# Patient Record
Sex: MALE | Race: WHITE | NOT HISPANIC OR LATINO | URBAN - METROPOLITAN AREA
[De-identification: names, ages, dates, MRNs, and addresses within clinical notes are randomized per-mention and may not be internally consistent; named-entity substitution may affect disease eponyms.]

---

## 2023-05-18 ENCOUNTER — PREP FOR PROCEDURE (OUTPATIENT)
Dept: INTERVENTIONAL RADIOLOGY/VASCULAR | Facility: CLINIC | Age: 73
End: 2023-05-18

## 2023-05-18 DIAGNOSIS — N18.6 ESRD (END STAGE RENAL DISEASE) (HCC): Primary | ICD-10-CM

## 2023-05-30 ENCOUNTER — TELEPHONE (OUTPATIENT)
Dept: INTERVENTIONAL RADIOLOGY/VASCULAR | Facility: HOSPITAL | Age: 73
End: 2023-05-30

## 2023-05-31 ENCOUNTER — TELEPHONE (OUTPATIENT)
Dept: RADIOLOGY | Facility: HOSPITAL | Age: 73
End: 2023-05-31

## 2023-05-31 DIAGNOSIS — E83.39 HYPERPHOSPHATEMIA: Primary | ICD-10-CM

## 2023-05-31 RX ORDER — SEVELAMER CARBONATE 800 MG/1
800 TABLET, FILM COATED ORAL
Qty: 270 TABLET | Refills: 3 | Status: SHIPPED | OUTPATIENT
Start: 2023-05-31

## 2023-07-05 DIAGNOSIS — E83.39 HYPERPHOSPHATEMIA: ICD-10-CM

## 2023-07-05 RX ORDER — SEVELAMER CARBONATE 800 MG/1
1600 TABLET, FILM COATED ORAL
Qty: 540 TABLET | Refills: 3 | Status: SHIPPED | OUTPATIENT
Start: 2023-07-05

## 2024-01-31 DIAGNOSIS — E83.39 HYPERPHOSPHATEMIA: ICD-10-CM

## 2024-01-31 RX ORDER — SEVELAMER CARBONATE 800 MG/1
2400 TABLET, FILM COATED ORAL
Qty: 810 TABLET | Refills: 3 | Status: SHIPPED | OUTPATIENT
Start: 2024-01-31

## 2024-03-29 DIAGNOSIS — E83.39 HYPERPHOSPHATEMIA: Primary | ICD-10-CM

## 2024-03-29 DIAGNOSIS — N18.6 ESRD (END STAGE RENAL DISEASE) ON DIALYSIS (HCC): ICD-10-CM

## 2024-03-29 DIAGNOSIS — Z99.2 ESRD (END STAGE RENAL DISEASE) ON DIALYSIS (HCC): ICD-10-CM

## 2024-03-29 RX ORDER — SUCROFERRIC OXYHYDROXIDE 500 MG/1
1 TABLET, CHEWABLE ORAL
Qty: 270 TABLET | Refills: 3 | Status: SHIPPED | OUTPATIENT
Start: 2024-03-29

## 2024-06-11 ENCOUNTER — TREATMENT (OUTPATIENT)
Dept: NEPHROLOGY | Facility: CLINIC | Age: 74
End: 2024-06-11

## 2024-06-11 DIAGNOSIS — N18.6 ESRD (END STAGE RENAL DISEASE) ON DIALYSIS (HCC): Primary | ICD-10-CM

## 2024-06-11 DIAGNOSIS — Z99.2 ESRD (END STAGE RENAL DISEASE) ON DIALYSIS (HCC): Primary | ICD-10-CM

## 2024-06-11 NOTE — PROGRESS NOTES
ESRD Follow Up Note    Alexandro Jefferson was recently hospitized.     Reason for admission: GI    Outpatient unit: [place]    Outpatient Shift: MWF 1    Follow up was done on 6/10/2024 , In-person    Changes made: Sarita Mc was seen for hospital follow-up at dialysis clinic on 6/10.  He was hospitalized with diverticulitis.  He was treated with a 7-day course of antibiotics.  He was uncertain concerning the name of the antibiotic but he is feeling better.  He was having very severe diarrhea when he was ill and felt that he was very dehydrated.  I encouraged him to let us know at dialysis when he is having a flare so that we may be able to provide some additional fluids on treatment.  He was in agreement with this plan.  I encouraged follow-up with GI but he is more comfortable at this time following up with his primary care provider.  No adjustment in dialysis prescription at this time.  No indication to adjust target weight.

## 2024-08-14 DIAGNOSIS — E83.39 HYPERPHOSPHATEMIA: ICD-10-CM

## 2024-08-14 RX ORDER — SEVELAMER CARBONATE 800 MG/1
2400 TABLET, FILM COATED ORAL
Qty: 810 TABLET | Refills: 3 | Status: SHIPPED | OUTPATIENT
Start: 2024-08-14

## 2024-09-16 DIAGNOSIS — I77.0 AVF (ARTERIOVENOUS FISTULA) (HCC): ICD-10-CM

## 2024-09-16 DIAGNOSIS — N18.6 ESRD (END STAGE RENAL DISEASE) ON DIALYSIS (HCC): Primary | ICD-10-CM

## 2024-09-16 DIAGNOSIS — Z01.818 PRE-TRANSPLANT EVALUATION FOR CKD (CHRONIC KIDNEY DISEASE): ICD-10-CM

## 2024-09-16 DIAGNOSIS — Z99.2 ESRD (END STAGE RENAL DISEASE) ON DIALYSIS (HCC): Primary | ICD-10-CM

## 2024-09-17 ENCOUNTER — TELEPHONE (OUTPATIENT)
Age: 74
End: 2024-09-17

## 2024-09-17 NOTE — TELEPHONE ENCOUNTER
Caller: Merced Jefferson    Doctor: ANY VS    Reason for call: Wife calling to schedule:    CONSULT - REF NEPH. ESRD (end stage renal disease) on dialysis; discuss AVF. VM ordered and will need to be scheduled.  Pt currently has a chest port  T/TH appts in [place] preferred    Call back#: [phone]

## 2024-10-03 ENCOUNTER — HOSPITAL ENCOUNTER (OUTPATIENT)
Dept: NON INVASIVE DIAGNOSTICS | Facility: HOSPITAL | Age: 74
Discharge: HOME | End: 2024-10-03
Attending: INTERNAL MEDICINE
Payer: MEDICARE

## 2024-10-03 DIAGNOSIS — N18.6 ESRD (END STAGE RENAL DISEASE) ON DIALYSIS (HCC): ICD-10-CM

## 2024-10-03 DIAGNOSIS — Z99.2 ESRD (END STAGE RENAL DISEASE) ON DIALYSIS (HCC): ICD-10-CM

## 2024-10-03 DIAGNOSIS — Z01.818 PRE-TRANSPLANT EVALUATION FOR CKD (CHRONIC KIDNEY DISEASE): ICD-10-CM

## 2024-10-03 PROCEDURE — 93985 DUP-SCAN HEMO COMPL BI STD: CPT

## 2024-10-05 PROCEDURE — 93985 DUP-SCAN HEMO COMPL BI STD: CPT | Performed by: SURGERY

## 2024-10-08 ENCOUNTER — TELEPHONE (OUTPATIENT)
Dept: VASCULAR SURGERY | Facility: CLINIC | Age: 74
End: 2024-10-08

## 2024-10-08 ENCOUNTER — OFFICE VISIT (OUTPATIENT)
Dept: VASCULAR SURGERY | Facility: CLINIC | Age: 74
End: 2024-10-08
Payer: MEDICARE

## 2024-10-08 ENCOUNTER — CONSULT (OUTPATIENT)
Dept: CARDIOLOGY CLINIC | Facility: CLINIC | Age: 74
End: 2024-10-08
Payer: MEDICARE

## 2024-10-08 VITALS
SYSTOLIC BLOOD PRESSURE: 124 MMHG | HEIGHT: 70 IN | HEART RATE: 80 BPM | OXYGEN SATURATION: 99 % | BODY MASS INDEX: 31.07 KG/M2 | DIASTOLIC BLOOD PRESSURE: 58 MMHG | WEIGHT: 217 LBS

## 2024-10-08 VITALS
HEIGHT: 70 IN | BODY MASS INDEX: 30.69 KG/M2 | DIASTOLIC BLOOD PRESSURE: 70 MMHG | SYSTOLIC BLOOD PRESSURE: 120 MMHG | HEART RATE: 114 BPM | WEIGHT: 214.4 LBS

## 2024-10-08 DIAGNOSIS — I26.99 OTHER PULMONARY EMBOLISM WITHOUT ACUTE COR PULMONALE, UNSPECIFIED CHRONICITY (HCC): ICD-10-CM

## 2024-10-08 DIAGNOSIS — Z01.810 PREOP CARDIOVASCULAR EXAM: Primary | ICD-10-CM

## 2024-10-08 DIAGNOSIS — I77.0 AVF (ARTERIOVENOUS FISTULA) (HCC): ICD-10-CM

## 2024-10-08 DIAGNOSIS — N18.6 ESRD (END STAGE RENAL DISEASE) ON DIALYSIS (HCC): Primary | ICD-10-CM

## 2024-10-08 DIAGNOSIS — Z86.79 HISTORY OF ENDOCARDITIS: ICD-10-CM

## 2024-10-08 DIAGNOSIS — I27.9 CHRONIC PULMONARY HEART DISEASE (HCC): ICD-10-CM

## 2024-10-08 DIAGNOSIS — N18.6 ESRD (END STAGE RENAL DISEASE) ON DIALYSIS (HCC): ICD-10-CM

## 2024-10-08 DIAGNOSIS — Z99.2 ESRD (END STAGE RENAL DISEASE) ON DIALYSIS (HCC): ICD-10-CM

## 2024-10-08 DIAGNOSIS — Z79.899 ON AMIODARONE THERAPY: ICD-10-CM

## 2024-10-08 DIAGNOSIS — Z99.2 ESRD (END STAGE RENAL DISEASE) ON DIALYSIS (HCC): Primary | ICD-10-CM

## 2024-10-08 DIAGNOSIS — I48.0 PAROXYSMAL ATRIAL FIBRILLATION (HCC): ICD-10-CM

## 2024-10-08 DIAGNOSIS — E11.51 DIABETES MELLITUS WITH PERIPHERAL CIRCULATORY DISORDER (HCC): ICD-10-CM

## 2024-10-08 PROCEDURE — 99204 OFFICE O/P NEW MOD 45 MIN: CPT | Performed by: INTERNAL MEDICINE

## 2024-10-08 PROCEDURE — 99204 OFFICE O/P NEW MOD 45 MIN: CPT | Performed by: SURGERY

## 2024-10-08 PROCEDURE — 93000 ELECTROCARDIOGRAM COMPLETE: CPT | Performed by: INTERNAL MEDICINE

## 2024-10-08 RX ORDER — ACETAMINOPHEN 160 MG
2000 TABLET,DISINTEGRATING ORAL
COMMUNITY

## 2024-10-08 RX ORDER — AMIODARONE HYDROCHLORIDE 200 MG/1
200 TABLET ORAL DAILY
Qty: 90 TABLET | Refills: 3 | Status: SHIPPED | OUTPATIENT
Start: 2024-10-08

## 2024-10-08 RX ORDER — APIXABAN 5 MG/1
TABLET, FILM COATED ORAL
COMMUNITY

## 2024-10-08 RX ORDER — FAMOTIDINE 20 MG/1
20 TABLET, FILM COATED ORAL 2 TIMES DAILY
COMMUNITY
Start: 2024-08-04

## 2024-10-08 RX ORDER — CEFAZOLIN SODIUM 2 G/50ML
2000 SOLUTION INTRAVENOUS ONCE
OUTPATIENT
Start: 2024-10-08 | End: 2024-10-08

## 2024-10-08 RX ORDER — AMIODARONE HYDROCHLORIDE 100 MG/1
TABLET ORAL
COMMUNITY
End: 2024-10-08 | Stop reason: SDUPTHER

## 2024-10-08 RX ORDER — ONDANSETRON 8 MG/1
8 TABLET, ORALLY DISINTEGRATING ORAL EVERY 8 HOURS PRN
COMMUNITY
Start: 2024-07-18 | End: 2024-10-16

## 2024-10-08 RX ORDER — CHLORHEXIDINE GLUCONATE ORAL RINSE 1.2 MG/ML
15 SOLUTION DENTAL ONCE
OUTPATIENT
Start: 2024-10-08 | End: 2024-10-08

## 2024-10-08 RX ORDER — FOLIC ACID/VIT B COMPLEX AND C 0.8 MG
TABLET ORAL
COMMUNITY

## 2024-10-08 RX ORDER — FOLIC ACID 1 MG/1
TABLET ORAL
COMMUNITY
Start: 2024-08-04

## 2024-10-08 NOTE — ASSESSMENT & PLAN NOTE
He is noted to be in atrial fibrillation with rapid ventricular response.  Has right bundle branch block abnormality.  ECGs from recent hospitalization at [facility] suggest sinus rhythm with sinus tachycardia and frequent premature atrial contractions.  Right bundle branch block was chronic.  Documentation represents that he has chronic A-fib which is unlikely given sinus rhythm recently.    -- I am requesting an extended Holter monitor to assess his overall cardiac rhythm and consider rate control medications if necessary.  -- Chronic anticoagulation is recommended.  Given lack of history of stroke okay to hold it briefly for AV fistula surgery which is planned in the near future.  -- Will need close monitoring for occurrence of thromboembolism.  -- Anticoagulation should be reinitiated soon after the surgery.

## 2024-10-08 NOTE — PROGRESS NOTES
Ambulatory Visit  Name: Alexandro Jefferson      : 1950      MRN: [id number]  Encounter Provider: Olivia Ball DO  Encounter Date: 10/8/2024   Encounter department: [facility]    Assessment & Plan  ESRD (end stage renal disease) on dialysis ([facility])  Lab Results   Component Value Date    EGFR 9 (L) 2024    EGFR 8 (L) 2024    EGFR 6 (L) 09/10/2024    CREATININE 6.17 (H) 2024    CREATININE 6.89 (H) 2024    CREATININE 9.05 (H) 09/10/2024   After discussion of possible Endo AVF on the left versus new right upper extremity AV graft on the right patient has elected to proceed with creation of new right upper extremity AV graft.  The benefits, risks including but not limited to infection, bleeding, steal syndrome and alternatives were reviewed with patient.  Patient was agreeable to proceed.  Written consent was obtained (with approval from Alexandro)  by the patient's wife who was present in the office today secondary to patient's limited right hand/digit function.    Orders:    Ambulatory Referral to Vascular Surgery    Ambulatory Referral to Cardiology; Future    AVF (arteriovenous fistula) ([facility])    Orders:    Ambulatory Referral to Vascular Surgery    Ambulatory Referral to Cardiology; Future      History of Present Illness     Patient is new to our office. He is here today to review results of Vein Mapping done 10/3/2024. Patient is getting HD M-W- at [facility] via right side chest perm cath. Patient had a previous AVF in his left arm that stopped working several weeks ago. Patient is a non-smoker.         Alexandro Jefferson is a 74 y.o. male who presents to the office to discuss new dialysis access creation.  He has history of prior left wrist/forearm fistula created by Dr. Cooper Ulloa.  Back in August he was hospitalized with left arm swelling.  It appears he underwent IR evaluation/fistulogram with reports of perianastomotic stenosis as well as high-grade  "stenosis/occlusion of left subclavian vein versus chronic DVT noted on duplex.  He is currently dialyzing Monday, Wednesday, and Fridays at [facility] via a right chest wall catheter.  Of note he was also recently treated for a mitral valve endocarditis/MSSA bacteremia.  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    History obtained from : patient and patient's Significant Other  Review of Systems   Constitutional: Negative.    HENT: Negative.     Eyes: Negative.    Respiratory:  Positive for shortness of breath.    Cardiovascular: Negative.    Gastrointestinal: Negative.    Endocrine: Negative.    Genitourinary: Negative.    Musculoskeletal:  Positive for gait problem.   Skin: Negative.    Allergic/Immunologic: Positive for food allergies (shellfish).   Hematological: Negative.    Psychiatric/Behavioral: Negative.       Past Medical History   History reviewed. No pertinent past medical history.  Past Surgical History:   Procedure Laterality Date    FL LUMBAR PUNCTURE DIAGNOSTIC  3/19/2021     History reviewed. No pertinent family history.  Current Outpatient Medications on File Prior to Visit   Medication Sig Dispense Refill    Acetaminophen 500 MG Take 500 mg by mouth      Cholecalciferol (Vitamin D3) 50 MCG (2000 UT) capsule Take 2,000 Units by mouth      cholestyramine light 4 g POWD Take 4 g by mouth 2 (two) times a day      Eliquis 5 MG       famotidine (PEPCID) 20 mg tablet Take 20 mg by mouth 2 (two) times a day      folic acid (FOLVITE) 1 mg tablet       ondansetron (ZOFRAN-ODT) 8 mg disintegrating tablet Apply 8 mg to cheek every 8 (eight) hours as needed      sevelamer carbonate (RENVELA) 800 mg tablet Take 3 tablets (2,400 mg total) by mouth 3 (three) times a day with meals 810 tablet 3    Sucroferric Oxyhydroxide (Velphoro) 500 MG CHEW Chew 1 tablet (500 mg total) 3 (three) times a day with meals " "(Patient not taking: Reported on 10/8/2024) 270 tablet 3     No current facility-administered medications on file prior to visit.     Allergies   Allergen Reactions    Metformin GI Intolerance and Nausea Only    Cinacalcet Diarrhea and GI Intolerance    Gabapentin GI Intolerance    Oxycodone Confusion     \"Feel spacey\"    Scopolamine Confusion, Dizziness and Hallucinations     Transdermal patch   Incoherent speech    Shellfish Allergy - Food Allergy Other (See Comments)     gout      Current Outpatient Medications on File Prior to Visit   Medication Sig Dispense Refill    Acetaminophen 500 MG Take 500 mg by mouth      Cholecalciferol (Vitamin D3) 50 MCG (2000 UT) capsule Take 2,000 Units by mouth      cholestyramine light 4 g POWD Take 4 g by mouth 2 (two) times a day      Eliquis 5 MG       famotidine (PEPCID) 20 mg tablet Take 20 mg by mouth 2 (two) times a day      folic acid (FOLVITE) 1 mg tablet       ondansetron (ZOFRAN-ODT) 8 mg disintegrating tablet Apply 8 mg to cheek every 8 (eight) hours as needed      sevelamer carbonate (RENVELA) 800 mg tablet Take 3 tablets (2,400 mg total) by mouth 3 (three) times a day with meals 810 tablet 3    Sucroferric Oxyhydroxide (Velphoro) 500 MG CHEW Chew 1 tablet (500 mg total) 3 (three) times a day with meals (Patient not taking: Reported on 10/8/2024) 270 tablet 3     No current facility-administered medications on file prior to visit.      Social History     Tobacco Use    Smoking status: Never    Smokeless tobacco: Never   Vaping Use    Vaping status: Never Used   Substance and Sexual Activity    Alcohol use: Not Currently    Drug use: Never    Sexual activity: Not on file         Objective     /58 (BP Location: Left arm, Patient Position: Sitting, Cuff Size: Standard)   Pulse 80   Ht 5' 10\" (1.778 m)   Wt 98.4 kg (217 lb)   SpO2 99%   BMI 31.14 kg/m²     Physical Exam  Constitutional:       General: He is not in acute distress.     Appearance: He is " well-developed.   HENT:      Head: Normocephalic and atraumatic.   Eyes:      General: No scleral icterus.     Conjunctiva/sclera: Conjunctivae normal.   Neck:      Trachea: No tracheal deviation.   Cardiovascular:      Rate and Rhythm: Normal rate and regular rhythm.      Pulses:           Radial pulses are 0 on the right side and 0 on the left side.      Heart sounds: Normal heart sounds.   Pulmonary:      Effort: Pulmonary effort is normal.      Breath sounds: Normal breath sounds.   Abdominal:      Palpations: Abdomen is soft. Mass: no appreciable aortic pulsation/aneurysm.   Musculoskeletal:         General: Normal range of motion.      Cervical back: Normal range of motion and neck supple.   Skin:     General: Skin is warm and dry.   Neurological:      Mental Status: He is alert and oriented to person, place, and time.   Psychiatric:         Mood and Affect: Mood normal.         Behavior: Behavior normal.         Thought Content: Thought content normal.         Judgment: Judgment normal.       Administrative Statements   I have spent a total time of 45 minutes in caring for this patient on the day of the visit/encounter including Diagnostic results, Prognosis, Risks and benefits of tx options, Instructions for management, Patient and family education, Importance of tx compliance, Risk factor reductions, Impressions, Counseling / Coordination of care, Documenting in the medical record, Reviewing / ordering tests, medicine, procedures  , and Obtaining or reviewing history  .    Operative Scheduling Information:    Hospital:  [facility]    Physician:  Quinn    Surgery: Creation of right upper extremity AV graft    Urgency:  Standard    Level:  Level 3: Outpatients to be scheduled for elective surgery than can be delayed up to 4 weeks without reasonable expectation of detriment to patient    Case Length:  3 hours    Post-op Bed:  Outpatient    OR Table:  Standard    Equipment Needs:  Product/Implant: Tapered  "[place] graft    Medication Instructions:  Eliquis:  Hold for 2 days prior to procedure (if okay with cardiology\".  Of note patient will require cardiac restratification/clearance from cardiology secondary to recent history of mitral valve endocarditis.    Hydration:  No    Contrast Allergy:  no   "

## 2024-10-08 NOTE — ASSESSMENT & PLAN NOTE
RELEVANT FINDINGS   Needs right AV fistula/graft creation for hemodialysis.  Has no absolute contraindication for undergoing this procedure.  He is noted to be in atrial fibrillation.  Though his notes mention he has chronic A-fib previous EKGs at [facility] showed sinus tachycardia with PACs.  Suspect atrial fibrillation is more paroxysmal than chronic.  Recent echocardiogram demonstrated preserved left ventricular function.  There was no evidence of left atrial appendage thrombus.   CURRENT RELEVANT MEDICATIONS Is on amiodarone 100 mg daily.  He is on Eliquis 5 mg twice daily.   GOALS Minimize risk for CAD relating to planned procedure.   MEDICATION CHANGES Can undergo planned surgery without further cardiac testing.  Given high risk for bleeding okay to hold Eliquis 48 hours before the procedure.   OTHER RECOMMENDATIONS -- May proceed with planned surgery.  -- May use periprocedural IV beta-blocker to control heart rate to keep systolic blood pressures consistently at or less than 100 mmHg.  -- Needs chronic care for atrial fibrillation.  -- I am advising him to increase the dose of amiodarone to 200 mg once daily for now.  -- I am arranging him to undergo 2-week extended Holter monitor to analyze his cardiac rhythm and we will consider rate control medications or may be 2-3 times weekly digoxin to control heart rate if he is tachycardic.  -- Advising to keep an eye on symptoms and report to us if he develops any palpitations or passing out or near passing out episodes or has worsening shortness of breath with physical activity.  -- Owing to his comorbidities would not consider him for cardioversion at this time.

## 2024-10-08 NOTE — ASSESSMENT & PLAN NOTE
Lab Results   Component Value Date    EGFR 9 (L) 09/14/2024    EGFR 8 (L) 09/12/2024    EGFR 6 (L) 09/10/2024    CREATININE 6.17 (H) 09/14/2024    CREATININE 6.89 (H) 09/12/2024    CREATININE 9.05 (H) 09/10/2024   After discussion of possible Endo AVF on the left versus new right upper extremity AV graft on the right patient has elected to proceed with creation of new right upper extremity AV graft.  The benefits, risks including but not limited to infection, bleeding, steal syndrome and alternatives were reviewed with patient.  Patient was agreeable to proceed.  Written consent was obtained (with approval from Alexandro)  by the patient's wife who was present in the office today secondary to patient's limited right hand/digit function.    Orders:    Ambulatory Referral to Vascular Surgery    Ambulatory Referral to Cardiology; Future

## 2024-10-08 NOTE — ASSESSMENT & PLAN NOTE
On antiarrhythmic therapy with amiodarone.  I am advising him to increase the amiodarone to 200 mg once daily.  A new prescription is sent to his pharmacy.  -- He will need close follow-up for amiodarone related side effects and toxicities.  The schedule of monitoring is outlined below.    RECOMMENDED ROUTINE MONITORING FOR PATIENTS RECEIVING AMIODARONE  Thyroid function tests--- Baseline and every 6 mo   Electrolytes, serum creatinine --- Baseline and as indicated   Chest radiograph--- Baseline and annually   Pulmonary function tests, with Dlco -- Baseline and for unexplained dyspnea or new chest radiographic findings.  Ophthalmologic evaluation--- If visual impairment or for symptoms   ECG--- Baseline and annually

## 2024-10-08 NOTE — PROGRESS NOTES
[facility]  [facility]  Primary Office: [address] [place] [id number]  Phone: [phone]; Fax: [phone]  *-*-*-*-*-*-*-*-*-*-*-*-*-*-*-*-*-*-*-*-*-*-*-*-*-*-*-*-*-*-*-*-*-*-*-*-*-*-*-*-*-*-*-*-*-*-*-*-*-*-*-*-*-*  ENCOUNTER DATE: 10/08/24 4:17 PM  PATIENT NAME: Alexandro Jefferson   1950    [id number]  Age: 74 y.o.      Sex: male  ENCOUNTER PROVIDER:  Ana Lilia Barkley MD, [facility], [facility]  PRIMARYCARE PHYSICIAN: Nehemias Sanchez MD  REFERRING PHYSICIAN: Olivia Ball DO  [address] [place] [id number] Olivia Ball DO   *-*-*-*-*-*-*-*-*-*-*-*-*-*-*-*-*-*-*-*-*-*-*-*-*-*-*-*-*-*-*-*-*-*-*-*-*-*-*-*-*-*-*-*-*-*-*-*-*-*-*-*-*-*-  REASON FOR REFERRAL: Operative cardiac risk provide AV fistula/ graft creation.    *-*-*-*-*-*-*-*-*-*-*-*-*-*-*-*-*-*-*-*-*-*-*-*-*-*-*-*-*-*-*-*-*-*-*-*-*-*-*-*-*-*-*-*-*-*-*-*-*-*-*-*-*-*-  CARDIOLOGY ASSESSMENT & PLAN:   Diagnosis ICD-10-CM Associated Orders   1. Preop cardiovascular exam  Z01.810 POCT ECG     AMB extended holter monitor      2. ESRD (end stage renal disease) on dialysis ([facility])  N18.6 Ambulatory Referral to Cardiology    Z99.2       3. AVF (arteriovenous fistula) ([facility])  I77.0 Ambulatory Referral to Cardiology      4. Paroxysmal atrial fibrillation ([facility])  I48.0 AMB extended holter monitor     amiodarone 200 mg tablet      5. Other pulmonary embolism without acute cor pulmonale, unspecified chronicity ([facility])  I26.99       6. Chronic pulmonary heart disease ([facility])  I27.9       7. History of endocarditis  Z86.79         1. Preop cardiovascular exam  Assessment & Plan:    RELEVANT FINDINGS   Needs right AV fistula/graft creation for hemodialysis.  Has no absolute contraindication for undergoing this procedure.  He is noted to be in atrial fibrillation.  Though his notes mention he has chronic A-fib previous EKGs at [facility] showed sinus tachycardia with PACs.  Suspect atrial  fibrillation is more paroxysmal than chronic.  Recent echocardiogram demonstrated preserved left ventricular function.  There was no evidence of left atrial appendage thrombus.   CURRENT RELEVANT MEDICATIONS Is on amiodarone 100 mg daily.  He is on Eliquis 5 mg twice daily.   GOALS Minimize risk for CAD relating to planned procedure.   MEDICATION CHANGES Can undergo planned surgery without further cardiac testing.  Given high risk for bleeding okay to hold Eliquis 48 hours before the procedure.   OTHER RECOMMENDATIONS -- May proceed with planned surgery.  -- May use periprocedural IV beta-blocker to control heart rate to keep systolic blood pressures consistently at or less than 100 mmHg.  -- Needs chronic care for atrial fibrillation.  -- I am advising him to increase the dose of amiodarone to 200 mg once daily for now.  -- I am arranging him to undergo 2-week extended Holter monitor to analyze his cardiac rhythm and we will consider rate control medications or may be 2-3 times weekly digoxin to control heart rate if he is tachycardic.  -- Advising to keep an eye on symptoms and report to us if he develops any palpitations or passing out or near passing out episodes or has worsening shortness of breath with physical activity.  -- Owing to his comorbidities would not consider him for cardioversion at this time.     Orders:  -     POCT ECG  -     AMB extended holter monitor; Future; Expected date: 10/08/2024  2. ESRD (end stage renal disease) on dialysis ([facility])  -     Ambulatory Referral to Cardiology  3. AVF (arteriovenous fistula) ([facility])  -     Ambulatory Referral to Cardiology  4. Paroxysmal atrial fibrillation ([facility])  Assessment & Plan:  He is noted to be in atrial fibrillation with rapid ventricular response.  Has right bundle branch block abnormality.  ECGs from recent hospitalization at [facility] suggest sinus rhythm with sinus tachycardia and frequent premature atrial contractions.  Right bundle branch block  was chronic.  Documentation represents that he has chronic A-fib which is unlikely given sinus rhythm recently.    -- I am requesting an extended Holter monitor to assess his overall cardiac rhythm and consider rate control medications if necessary.  -- Chronic anticoagulation is recommended.  Given lack of history of stroke okay to hold it briefly for AV fistula surgery which is planned in the near future.  -- Will need close monitoring for occurrence of thromboembolism.  -- Anticoagulation should be reinitiated soon after the surgery.  Orders:  -     AMB extended holter monitor; Future; Expected date: 10/08/2024  -     amiodarone 200 mg tablet; Take 1 tablet (200 mg total) by mouth daily  5. Other pulmonary embolism without acute cor pulmonale, unspecified chronicity ([facility])  6. Chronic pulmonary heart disease ([facility])  7. History of endocarditis  Assessment & Plan:  Recent questionable history of endocarditis in the setting of MSSA bacteremia.  On examination I did not appreciate significant murmur.  Recent NIGEL showed small mobile fibrinous strand density on the posterior mitral valve leaflet with mild mitral valve regurgitation.  Has been afebrile recently.  Has been able to tolerate dialysis.    -- With continued current therapy and consider repeat echocardiogram in 2 to 3 months if he is having any symptoms.     *-*-*-*-*-*-*-*-*-*-*-*-*-*-*-*-*-*-*-*-*-*-*-*-*-*-*-*-*-*-*-*-*-*-*-*-*-*-*-*-*-*-*-*-*-*-*-*-*-*-*-*-*-*-  CURRENT ECG:  Results for orders placed or performed in visit on 10/08/24   POCT ECG    Narrative    Atrial fibrillation with rapid ventricular response, right bundle branch block with repolarization abnormalities.   bpm, nonspecific ST-T wave abnormalities diffusely.  Compared to reports of EKGs from [facility] from September 2024 atrial fibrillation rhythm is new.  Previous reported ECG showed sinus tachycardia with PACs right bundle branch block.        *-*-*-*-*-*-*-*-*-*-*-*-*-*-*-*-*-*-*-*-*-*-*-*-*-*-*-*-*-*-*-*-*-*-*-*-*-*-*-*-*-*-*-*-*-*-*-*-*-*-*-*-*-*-  HISTORY OF PRESENT ILLNESS:  Mr. Alexandro Jefferson is a 74-year-old gentleman who is here for visit accompanied with his wife.  He has complicated medical history that includes:  1.  Remote history of pulmonary embolism  2.  History of end-stage renal  disease, on hemodialysis since 2021.  3.  History of pancreatitis leading up to prolonged hospitalization and dependence on mechanical ventilation, history of tracheostomy and PEG tube placement  4.  Now reported as chronic atrial fibrillation  5.  Secondary hyperparathyroidism  6.  History of dialysis fistula stenosis  7.  Status post recent MSSA bacteremia with suspected endocarditis treated with prolonged antibiotic therapy  9.  Diabetes mellitus  10.  Obesity  11.  Ambulatory dysfunction  12.  Anemia due to chronic kidney disease  13.  Pulmonary hypertension  14.  Aortic valve stenosis.    He was seen in vascular surgery office this morning and is being considered for right arm AV graft creation for hemodialysis.  He previously had AV fistula in the left arm which developed stenosis.  She underwent temporary right IJ/subclavian hemodialysis catheter placement in August however subsequently he developed febrile illness and was diagnosed with possible endocarditis and bacteremia.  He was treated with antibiotic therapy.  NIGEL showed some fibrinous strands on the mitral valve concerning for endocarditis.  He completed antibiotic therapy yesterday.    As per him and his wife he was never diagnosed with coronary artery disease or stroke or congestive heart failure.    From a symptom perspective he reports that he is overall all right.  He does get fatigued easily and has exertional shortness of breath.  Denies any feeling of palpitations or passing out or near passing out.  He uses a walker for ambulation.  He has had no recent falls.  Reports that he  sleeps in recliner.  He does have chronic stable lower extremity swelling.  He reports he was diagnosed with sleep apnea in the past but following his prolonged hospitalization in 2021 he no longer needed the CPAP.    Functional capacity status: Poor due to comorbidities   (Excellent- >10 METs; Good: (7-10 METs); Moderate (4-7 METs); Poor (<= 4 METs)    Any chronic stressors: None   (feeling of poor health, financial problems, and social isolation etc).    Tobacco or alcohol dependence: He has never been a smoker.  He denies any alcohol use or any history of illicit drug use.    He retired from various jobs including previously working at [employer] for 24 years and then other jobs and subsequently retired from selling cars.    Family history: His father had aortic valve replacement at 80 years of age.    Current cardiac meds: Eliquis 5 mg twice daily amiodarone 100 mg daily.  He is not on any beta-blocker or other medications.    Previous blood work:   Blood work 9/14/2024 shows sodium 135 potassium 3.8 chloride 101 bicarb 23 BUN 40 creatinine 6.17 GFR 9 phosphorus 6.4  Lipid profile 7/1/2022 total cholesterol 192 triglyceride 374 HDL 32 calculated LDL 75  High-sensitivity troponins checked on 8/26/2024 negative at 35 and 40 respectively.    Previous cardiac studies:   Transesophageal echocardiogram 8/29/2024: Normal left ventricular size and function.  EF 55 to 60%.  Dilated right ventricle with normal right ventricular function.  Normal left atrial size.  Normal flow velocities in left atrial appendage with no evidence of thrombus.  No PFO.  Normal right atrial size.  Mild mitral valve regurgitatio.  Small, mobile, vegetation versus fibrinous strand on the posterior mitral leaflet measuring 0.49 cm.  Freely mobile.  Mild tricuspid valve regurgitation.  Moderate aortic valve stenosis with aortic valve area 1.3 cm², peak gradient 3 m/s and mean gradient 34, DVI 0.4  No pulmonic valve stenosis or regurgitation.  Small  "amount of atherosclerotic plaque noted in aorta.  No pericardial effusion    *-*-*-*-*-*-*-*-*-*-*-*-*-*-*-*-*-*-*-*-*-*-*-*-*-*-*-*-*-*-*-*-*-*-*-*-*-*-*-*-*-*-*-*-*-*-*-*-*-*-*-*-*-*  PAST MEDICAL HISTORY:  No past medical history on file. PAST SURGICAL HISTORY:   Past Surgical History:   Procedure Laterality Date    FL LUMBAR PUNCTURE DIAGNOSTIC  3/19/2021         FAMILY HISTORY:  No family history on file. SOCIAL HISTORY:  Social History     Tobacco Use   Smoking Status Never   Smokeless Tobacco Never      Social History     Substance and Sexual Activity   Alcohol Use Not Currently     Social History     Substance and Sexual Activity   Drug Use Never    [unfilled]     *-*-*-*-*-*-*-*-*-*-*-*-*-*-*-*-*-*-*-*-*-*-*-*-*-*-*-*-*-*-*-*-*-*-*-*-*-*-*-*-*-*-*-*-*-*-*-*-*-*-*-*-*-*  ALLERGIES:  Allergies   Allergen Reactions    Metformin GI Intolerance and Nausea Only    Cinacalcet Diarrhea and GI Intolerance    Gabapentin GI Intolerance    Oxycodone Confusion     \"Feel spacey\"    Scopolamine Confusion, Dizziness and Hallucinations     Transdermal patch   Incoherent speech    Shellfish Allergy - Food Allergy Other (See Comments)     gout    CURRENT SCHEDULED MEDICATIONS:    Current Outpatient Medications:     Acetaminophen 500 MG, Take 500 mg by mouth, Disp: , Rfl:     amiodarone 200 mg tablet, Take 1 tablet (200 mg total) by mouth daily, Disp: 90 tablet, Rfl: 3    B Complex-C-Folic Acid (Lissa-Geno) TABS, , Disp: , Rfl:     Cholecalciferol (Vitamin D3) 50 MCG (2000 UT) capsule, Take 2,000 Units by mouth, Disp: , Rfl:     cholestyramine light 4 g POWD, Take 4 g by mouth 2 (two) times a day, Disp: , Rfl:     Eliquis 5 MG, , Disp: , Rfl:     famotidine (PEPCID) 20 mg tablet, Take 20 mg by mouth 2 (two) times a day, Disp: , Rfl:     folic acid (FOLVITE) 1 mg tablet, , Disp: , Rfl:     ondansetron (ZOFRAN-ODT) 8 mg disintegrating tablet, Apply 8 mg to cheek every 8 (eight) hours as needed, Disp: , Rfl:     sevelamer carbonate " (RENVELA) 800 mg tablet, Take 3 tablets (2,400 mg total) by mouth 3 (three) times a day with meals, Disp: 810 tablet, Rfl: 3    Sucroferric Oxyhydroxide (Velphoro) 500 MG CHEW, Chew 1 tablet (500 mg total) 3 (three) times a day with meals (Patient not taking: Reported on 10/8/2024), Disp: 270 tablet, Rfl: 3     *-*-*-*-*-*-*-*-*-*-*-*-*-*-*-*-*-*-*-*-*-*-*-*-*-*-*-*-*-*-*-*-*-*-*-*-*-*-*-*-*-*-*-*-*-*-*-*-*-*-*-*-*-*  REVIEW OF SYMPTOMS:    Positive for: As noted above in HPI  Negative for: All remaining as reviewed below and in HPI. SYSTEM SYMPTOMS REVIEWED:  General--weight change, fever, night sweats  Respiratoryl-- Wheezing, shortness of breath, cough, URI symptoms, sputum, blood  Cardiovascular--chest pain, syncope, dyspnea on exertion, edema, decline in exercise tolerance, claudication   Gastrointestinal--persistent vomiting, diarrhea, abdominal distention, blood in stool   Muscular or skeletal--joint pain or swelling   Neurologic--headaches, syncope, abnormal movement  Hematologic--history of easy bruising and bleeding   Endocrine--thyroid enlargement, heat or cold intolerance, polyuria   Psychiatric--anxiety, depression      *-*-*-*-*-*-*-*-*-*-*-*-*-*-*-*-*-*-*-*-*-*-*-*-*-*-*-*-*-*-*-*-*-*-*-*-*-*-*-*-*-*-*-*-*-*-*-*-*-*-*-*-*-*  CURRENT OUTPATIENT MEDICATIONS:     Current Outpatient Medications:     Acetaminophen 500 MG, Take 500 mg by mouth, Disp: , Rfl:     amiodarone 200 mg tablet, Take 1 tablet (200 mg total) by mouth daily, Disp: 90 tablet, Rfl: 3    B Complex-C-Folic Acid (Lissa-Geno) TABS, , Disp: , Rfl:     Cholecalciferol (Vitamin D3) 50 MCG (2000 UT) capsule, Take 2,000 Units by mouth, Disp: , Rfl:     cholestyramine light 4 g POWD, Take 4 g by mouth 2 (two) times a day, Disp: , Rfl:     Eliquis 5 MG, , Disp: , Rfl:     famotidine (PEPCID) 20 mg tablet, Take 20 mg by mouth 2 (two) times a day, Disp: , Rfl:     folic acid (FOLVITE) 1 mg tablet, , Disp: , Rfl:     ondansetron (ZOFRAN-ODT) 8 mg  "disintegrating tablet, Apply 8 mg to cheek every 8 (eight) hours as needed, Disp: , Rfl:     sevelamer carbonate (RENVELA) 800 mg tablet, Take 3 tablets (2,400 mg total) by mouth 3 (three) times a day with meals, Disp: 810 tablet, Rfl: 3    Sucroferric Oxyhydroxide (Velphoro) 500 MG CHEW, Chew 1 tablet (500 mg total) 3 (three) times a day with meals (Patient not taking: Reported on 10/8/2024), Disp: 270 tablet, Rfl: 3    *-*-*-*-*-*-*-*-*-*-*-*-*-*-*-*-*-*-*-*-*-*-*-*-*-*-*-*-*-*-*-*-*-*-*-*-*-*-*-*-*-*-*-*-*-*-*-*-*-*-*-*-*-*  VITAL SIGNS:  Vitals:    10/08/24 1504   BP: 120/70   BP Location: Right arm   Patient Position: Sitting   Cuff Size: Large   Pulse: (!) 114   Weight: 97.3 kg (214 lb 6.4 oz)   Height: 5' 10\" (1.778 m)       BMI: Body mass index is 30.76 kg/m².    WEIGHTS:   Wt Readings from Last 25 Encounters:   10/08/24 97.3 kg (214 lb 6.4 oz)   10/08/24 98.4 kg (217 lb)        *-*-*-*-*-*-*-*-*-*-*-*-*-*-*-*-*-*-*-*-*-*-*-*-*-*-*-*-*-*-*-*-*-*-*-*-*-*-*-*-*-*-*-*-*-*-*-*-*-*-*-*-*-*-  PHYSICAL EXAM:  General Appearance:    Alert, cooperative, no distress, appears stated age   Head, Eyes, ENT:  Conjunctival pallor, moist mucous mebranes.   Neck:   Supple, no carotid bruit. No JVD, no obvious lymphadenoapthy   Back:     Symmetric, no curvature.   Lungs:     Respirations unlabored. Clear to auscultation bilaterally,    Chest wall:  Right subclavian dialysis catheter.   Heart:  Irregularly irregular rhythm, tachycardic, distant heart sound, unable to appreciate murmur.   Abdomen:     Soft, non-tender.   Extremities:   Extremities warm, no cyanosis, trace to 1+ pedal edema   Skin: Mild to moderate venostatic changes in lower extremities.   Normal skin color, texture, and turgor. No rashes or lesions   Neuro: Pt is alert and oriented time 3 with no gross motor deficits.   Psychiatric/Behavioral: Mood is normal. Behavior is normal. "   *-*-*-*-*-*-*-*-*-*-*-*-*-*-*-*-*-*-*-*-*-*-*-*-*-*-*-*-*-*-*-*-*-*-*-*-*-*-*-*-*-*-*-*-*-*-*-*-*-*-*-*-*-*-  LABORATORY DATA: I have personally reviewed the available laboratory data.        Potassium   Date Value Ref Range Status   09/14/2024 3.8 3.5 - 5.2 mmol/L Final   09/12/2024 3.8 3.5 - 5.2 mmol/L Final   09/10/2024 4.1 3.5 - 5.2 mmol/L Final     Chloride   Date Value Ref Range Status   09/14/2024 101 100 - 109 mmol/L Final   09/12/2024 101 100 - 109 mmol/L Final   09/10/2024 103 100 - 109 mmol/L Final     Carbon Dioxide   Date Value Ref Range Status   09/14/2024 23 21 - 31 mmol/L Final   09/12/2024 22 21 - 31 mmol/L Final   09/10/2024 20 (L) 21 - 31 mmol/L Final     BUN   Date Value Ref Range Status   09/14/2024 40 (H) 7 - 28 mg/dL Final   09/12/2024 43 (H) 7 - 28 mg/dL Final   09/10/2024 63 (H) 7 - 28 mg/dL Final     Creatinine   Date Value Ref Range Status   09/14/2024 6.17 (H) 0.53 - 1.30 mg/dL Final   09/12/2024 6.89 (H) 0.53 - 1.30 mg/dL Final   09/10/2024 9.05 (H) 0.53 - 1.30 mg/dL Final     eGFRcr   Date Value Ref Range Status   09/14/2024 9 (L) >59 Final   09/12/2024 8 (L) >59 Final   09/10/2024 6 (L) >59 Final     Calcium   Date Value Ref Range Status   09/14/2024 9.1 8.5 - 10.1 mg/dL Final   09/12/2024 9.2 8.5 - 10.1 mg/dL Final   09/10/2024 9.2 8.5 - 10.1 mg/dL Final     AST   Date Value Ref Range Status   09/04/2024 20 <41 U/L Final   08/26/2024 15 <41 U/L Final   07/30/2024 14 <41 U/L Final     ALT   Date Value Ref Range Status   09/04/2024 <3 <56 U/L Final   08/26/2024 9 <56 U/L Final   07/30/2024 8 <56 U/L Final     Alkaline Phosphatase   Date Value Ref Range Status   09/04/2024 101 35 - 120 U/L Final   08/26/2024 98 35 - 120 U/L Final   07/30/2024 109 35 - 120 U/L Final     Magnesium   Date Value Ref Range Status   09/04/2024 2.3 (H) 1.4 - 2.2 mg/dL Final   06/06/2024 2.2 1.4 - 2.2 mg/dL Final   07/01/2022 2.1 1.6 - 2.3 mg/dL Final     Hemoglobin   Date Value Ref Range Status   09/01/2022  "8.4 (L) 12.5 - 17.0 g/dL Final   07/09/2022 9.1 (L) 12.5 - 17.0 g/dL Final   04/19/2021 9.1 (L) 12.5 - 17.0 g/dL Final     Prothrombin Time   Date Value Ref Range Status   09/03/2024 15.7 (H) 12.0 - 14.6 sec Final   06/06/2024 16.6 (H) 12.0 - 14.6 sec Final     INR   Date Value Ref Range Status   09/03/2024 1.3  Final     Comment:     Interpretation  Suggested INR ranges for various  clinical conditions:                                           INR  Ambulatory Surgery          <1.5  Coumadinized Patients             (DVT,PE,MI or A.Fib)     2.0-3.0  Mechanical Heart Valve   2.5-3.5  Cardiogenic Embolus      2.5-3.5   06/06/2024 1.3  Final     Comment:     Interpretation  Suggested INR ranges for various  clinical conditions:                                           INR  Ambulatory Surgery          <1.5  Coumadinized Patients             (DVT,PE,MI or A.Fib)     2.0-3.0  Mechanical Heart Valve   2.5-3.5  Cardiogenic Embolus      2.5-3.5     No results found for: \"CK\", \"CKMB\", \"DIGOXIN\"  TSH   Date Value Ref Range Status   07/16/2021 1.32 0.34 - 5.60 uIU/mL Final   04/02/2021 1.64 0.36 - 3.74 uIU/mL Final     Triglycerides   Date Value Ref Range Status   02/08/2021 328 (H) <150 mg/dL Final      Hemoglobin A1C   Date Value Ref Range Status   08/23/2024 4.5 <5.7 % Final     Comment:     Reference Range  Non-diabetic                     <5.7  Pre-diabetic                     5.7-6.4  Diabetic                         >=6.5  ADA target for diabetic control  <=7     No results found for: \"BLOODCX\", \"SPUTUMCULTUR\", \"GRAMSTAIN\", \"URINECX\", \"WOUNDCULT\", \"BODYFLUIDCUL\", \"MRSACULTURE\", \"INFLUAPCR\", \"INFLUBPCR\", \"RSVPCR\", \"LEGIONELLAUR\", \"CDIFFTOXINB\"    *-*-*-*-*-*-*-*-*-*-*-*-*-*-*-*-*-*-*-*-*-*-*-*-*-*-*-*-*-*-*-*-*-*-*-*-*-*-*-*-*-*-*-*-*-*-*-*-*-*-*-*-*-*-  RADIOLOGY RESULTS:  No results found.    *-*-*-*-*-*-*-*-*-*-*-*-*-*-*-*-*-*-*-*-*-*-*-*-*-*-*-*-*-*-*-*-*-*-*-*-*-*-*-*-*-*-*-*-*-*-*-*-*-*-*-*-*-*-  LAST " ECHOCARDIOGRAM AND OTHER CARDIOLOGY RESULTS:  No results found for this or any previous visit.    No results found for this or any previous visit.    No results found for this or any previous visit.    No results found for this or any previous visit.       *-*-*-*-*-*-*-*-*-*-*-*-*-*-*-*-*-*-*-*-*-*-*-*-*-*-*-*-*-*-*-*-*-*-*-*-*-*-*-*-*-*-*-*-*-*-*-*-*-*-*-*-*-*-  SIGNATURES:   @SIG@   Ana Lilia Barkley MD     CC:   MD Quinn Rutledge Calogero, DO

## 2024-10-08 NOTE — PATIENT INSTRUCTIONS
CARDIOLOGY ASSESSMENT & PLAN:   Diagnosis ICD-10-CM Associated Orders   1. Preop cardiovascular exam  Z01.810 POCT ECG     AMB extended holter monitor      2. ESRD (end stage renal disease) on dialysis ([facility])  N18.6 Ambulatory Referral to Cardiology    Z99.2       3. AVF (arteriovenous fistula) ([facility])  I77.0 Ambulatory Referral to Cardiology      4. Paroxysmal atrial fibrillation ([facility])  I48.0 AMB extended holter monitor      5. Other pulmonary embolism without acute cor pulmonale, unspecified chronicity ([facility])  I26.99       6. Chronic pulmonary heart disease ([facility])  I27.9       7. History of endocarditis  Z86.79         1. Preop cardiovascular exam  Assessment & Plan:    RELEVANT FINDINGS   Needs right AV fistula/graft creation for hemodialysis.  Has no absolute contraindication for undergoing this procedure.  He is noted to be in atrial fibrillation.  Though his notes mention he has chronic A-fib previous EKGs at [facility] showed sinus tachycardia with PACs.  Suspect atrial fibrillation is more paroxysmal than chronic.  Recent echocardiogram demonstrated preserved left ventricular function.  There was no evidence of left atrial appendage thrombus.   CURRENT RELEVANT MEDICATIONS Is on amiodarone 100 mg daily.  He is on Eliquis 5 mg twice daily.   GOALS Minimize risk for CAD relating to planned procedure.   MEDICATION CHANGES Can undergo planned surgery without further cardiac testing.  Given high risk for bleeding okay to hold Eliquis 48 hours before the procedure.   OTHER RECOMMENDATIONS -- May proceed with planned surgery.  -- May use periprocedural IV beta-blocker to control heart rate to keep systolic blood pressures consistently at or less than 100 mmHg.  -- Needs chronic care for atrial fibrillation.  -- I am advising him to increase the dose of amiodarone to 200 mg once daily for now.  -- I am arranging him to undergo 2-week extended Holter monitor to analyze his cardiac rhythm and we will  consider rate control medications or may be 2-3 times weekly digoxin to control heart rate if he is tachycardic.  -- Advising to keep an eye on symptoms and report to us if he develops any palpitations or passing out or near passing out episodes or has worsening shortness of breath with physical activity.  -- Owing to his comorbidities would not consider him for cardioversion at this time.     Orders:  -     POCT ECG  -     AMB extended holter monitor; Future; Expected date: 10/08/2024  2. ESRD (end stage renal disease) on dialysis ([facility])  -     Ambulatory Referral to Cardiology  3. AVF (arteriovenous fistula) ([facility])  -     Ambulatory Referral to Cardiology  4. Paroxysmal atrial fibrillation ([facility])  Assessment & Plan:  He is noted to be in atrial fibrillation with rapid ventricular response.  Has right bundle branch block abnormality.  ECGs from recent hospitalization at [facility] suggest sinus rhythm with sinus tachycardia and frequent premature atrial contractions.  Right bundle branch block was chronic.  Documentation represents that he has chronic A-fib which is unlikely given sinus rhythm recently.    -- I am requesting an extended Holter monitor to assess his overall cardiac rhythm and consider rate control medications if necessary.  -- Chronic anticoagulation is recommended.  Given lack of history of stroke okay to hold it briefly for AV fistula surgery which is planned in the near future.  -- Will need close monitoring for occurrence of thromboembolism.  -- Anticoagulation should be reinitiated soon after the surgery.  Orders:  -     AMB extended holter monitor; Future; Expected date: 10/08/2024  5. Other pulmonary embolism without acute cor pulmonale, unspecified chronicity ([facility])  6. Chronic pulmonary heart disease ([facility])  7. History of endocarditis  Assessment & Plan:  Recent questionable history of endocarditis in the setting of MSSA bacteremia.  On examination I did not appreciate significant murmur.   Recent NIGEL showed small mobile fibrinous strand density on the posterior mitral valve leaflet with mild mitral valve regurgitation.  Has been afebrile recently.  Has been able to tolerate dialysis.    -- With continued current therapy and consider repeat echocardiogram in 2 to 3 months if he is having any symptoms.

## 2024-10-08 NOTE — H&P (VIEW-ONLY)
Ambulatory Visit  Name: Alexandro Jefferson      : 1950      MRN: [id number]  Encounter Provider: Olivia Ball DO  Encounter Date: 10/8/2024   Enc[facility] department: [facility] [place]    Assessment & Plan  ESRD (end stage renal disease) on dialysis ([facility])  Lab Results   Component Value Date    EGFR 9 (L) 2024    EGFR 8 (L) 2024    EGFR 6 (L) 09/10/2024    CREATININE 6.17 (H) 2024    CREATININE 6.89 (H) 2024    CREATININE 9.05 (H) 09/10/2024   After discussion of possible Endo AVF on the left versus new right upper extremity AV graft on the right patient has elected to proceed with creation of new right upper extremity AV graft.  The benefits, risks including but not limited to infection, bleeding, steal syndrome and alternatives were reviewed with patient.  Patient was agreeable to proceed.  Written consent was obtained (with approval from Alexandro)  by the patient's wife who was present in the office today secondary to patient's limited right hand/digit function.    Orders:    Ambulatory Referral to Vascular Surgery    Ambulatory Referral to Cardiology; Future    AVF (arteriovenous fistula) ([facility])    Orders:    Ambulatory Referral to Vascular Surgery    Ambulatory Referral to Cardiology; Future      History of Present Illness     Patient is new to our office. He is here today to review results of Vein Mapping done 10/3/2024. Patient is getting HD M-W- at [facility] via right side chest perm cath. Patient had a previous AVF in his left arm that stopped working several weeks ago. Patient is a non-smoker.         Alexandro Jefferson is a 74 y.o. male who presents to the office to discuss new dialysis access creation.  He has history of prior left wrist/forearm fistula created by Dr. Cooper Ulloa.  Back in August he was hospitalized with left arm swelling.  It appears he underwent IR evaluation/fistulogram with reports of perianastomotic stenosis as well as high-grade  "stenosis/occlusion of left subclavian vein versus chronic DVT noted on duplex.  He is currently dialyzing Monday, Wednesday, and Fridays at [facility] via a right chest wall catheter.  Of note he was also recently treated for a mitral valve endocarditis/MSSA bacteremia.  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    History obtained from : patient and patient's Significant Other  Review of Systems   Constitutional: Negative.    HENT: Negative.     Eyes: Negative.    Respiratory:  Positive for shortness of breath.    Cardiovascular: Negative.    Gastrointestinal: Negative.    Endocrine: Negative.    Genitourinary: Negative.    Musculoskeletal:  Positive for gait problem.   Skin: Negative.    Allergic/Immunologic: Positive for food allergies (shellfish).   Hematological: Negative.    Psychiatric/Behavioral: Negative.       Past Medical History   History reviewed. No pertinent past medical history.  Past Surgical History:   Procedure Laterality Date    FL LUMBAR PUNCTURE DIAGNOSTIC  3/19/2021     History reviewed. No pertinent family history.  Current Outpatient Medications on File Prior to Visit   Medication Sig Dispense Refill    Acetaminophen 500 MG Take 500 mg by mouth      Cholecalciferol (Vitamin D3) 50 MCG (2000 UT) capsule Take 2,000 Units by mouth      cholestyramine light 4 g POWD Take 4 g by mouth 2 (two) times a day      Eliquis 5 MG       famotidine (PEPCID) 20 mg tablet Take 20 mg by mouth 2 (two) times a day      folic acid (FOLVITE) 1 mg tablet       ondansetron (ZOFRAN-ODT) 8 mg disintegrating tablet Apply 8 mg to cheek every 8 (eight) hours as needed      sevelamer carbonate (RENVELA) 800 mg tablet Take 3 tablets (2,400 mg total) by mouth 3 (three) times a day with meals 810 tablet 3    Sucroferric Oxyhydroxide (Velphoro) 500 MG CHEW Chew 1 tablet (500 mg total) 3 (three) times a day with meals " "(Patient not taking: Reported on 10/8/2024) 270 tablet 3     No current facility-administered medications on file prior to visit.     Allergies   Allergen Reactions    Metformin GI Intolerance and Nausea Only    Cinacalcet Diarrhea and GI Intolerance    Gabapentin GI Intolerance    Oxycodone Confusion     \"Feel spacey\"    Scopolamine Confusion, Dizziness and Hallucinations     Transdermal patch   Incoherent speech    Shellfish Allergy - Food Allergy Other (See Comments)     gout      Current Outpatient Medications on File Prior to Visit   Medication Sig Dispense Refill    Acetaminophen 500 MG Take 500 mg by mouth      Cholecalciferol (Vitamin D3) 50 MCG (2000 UT) capsule Take 2,000 Units by mouth      cholestyramine light 4 g POWD Take 4 g by mouth 2 (two) times a day      Eliquis 5 MG       famotidine (PEPCID) 20 mg tablet Take 20 mg by mouth 2 (two) times a day      folic acid (FOLVITE) 1 mg tablet       ondansetron (ZOFRAN-ODT) 8 mg disintegrating tablet Apply 8 mg to cheek every 8 (eight) hours as needed      sevelamer carbonate (RENVELA) 800 mg tablet Take 3 tablets (2,400 mg total) by mouth 3 (three) times a day with meals 810 tablet 3    Sucroferric Oxyhydroxide (Velphoro) 500 MG CHEW Chew 1 tablet (500 mg total) 3 (three) times a day with meals (Patient not taking: Reported on 10/8/2024) 270 tablet 3     No current facility-administered medications on file prior to visit.      Social History     Tobacco Use    Smoking status: Never    Smokeless tobacco: Never   Vaping Use    Vaping status: Never Used   Substance and Sexual Activity    Alcohol use: Not Currently    Drug use: Never    Sexual activity: Not on file         Objective     /58 (BP Location: Left arm, Patient Position: Sitting, Cuff Size: Standard)   Pulse 80   Ht 5' 10\" (1.778 m)   Wt 98.4 kg (217 lb)   SpO2 99%   BMI 31.14 kg/m²     Physical Exam  Constitutional:       General: He is not in acute distress.     Appearance: He is " well-developed.   HENT:      Head: Normocephalic and atraumatic.   Eyes:      General: No scleral icterus.     Conjunctiva/sclera: Conjunctivae normal.   Neck:      Trachea: No tracheal deviation.   Cardiovascular:      Rate and Rhythm: Normal rate and regular rhythm.      Pulses:           Radial pulses are 0 on the right side and 0 on the left side.      Heart sounds: Normal heart sounds.   Pulmonary:      Effort: Pulmonary effort is normal.      Breath sounds: Normal breath sounds.   Abdominal:      Palpations: Abdomen is soft. Mass: no appreciable aortic pulsation/aneurysm.   Musculoskeletal:         General: Normal range of motion.      Cervical back: Normal range of motion and neck supple.   Skin:     General: Skin is warm and dry.   Neurological:      Mental Status: He is alert and oriented to person, place, and time.   Psychiatric:         Mood and Affect: Mood normal.         Behavior: Behavior normal.         Thought Content: Thought content normal.         Judgment: Judgment normal.       Administrative Statements   I have spent a total time of 45 minutes in caring for this patient on the day of the visit/encounter including Diagnostic results, Prognosis, Risks and benefits of tx options, Instructions for management, Patient and family education, Importance of tx compliance, Risk factor reductions, Impressions, Counseling / Coordination of care, Documenting in the medical record, Reviewing / ordering tests, medicine, procedures  , and Obtaining or reviewing history  .    Operative Scheduling Information:    Hospital:  [facility]    Physician:  Quinn    Surgery: Creation of right upper extremity AV graft    Urgency:  Standard    Level:  Level 3: Outpatients to be scheduled for elective surgery than can be delayed up to 4 weeks without reasonable expectation of detriment to patient    Case Length:  3 hours    Post-op Bed:  Outpatient    OR Table:  Standard    Equipment Needs:  Product/Implant: Tapered  "[place] graft    Medication Instructions:  Eliquis:  Hold for 2 days prior to procedure (if okay with cardiology\".  Of note patient will require cardiac restratification/clearance from cardiology secondary to recent history of mitral valve endocarditis.    Hydration:  No    Contrast Allergy:  no   "

## 2024-10-08 NOTE — ASSESSMENT & PLAN NOTE
Orders:    Ambulatory Referral to Vascular Surgery    Ambulatory Referral to Cardiology; Future

## 2024-10-08 NOTE — TELEPHONE ENCOUNTER
REMINDER: Under Reason For Call, comments MUST be formatted as:   (Surgeon's Initials) / (Procedure)      Special Instructions / FYI: needs cardiac clearance and has appointment scheduled for 10/08/24 .     Consent: I certify that patient has signed, printed, timed, and dated their surgery consent.  I certify that the patient's LEGAL NAME and DATE OF BIRTH are written in the upper left corner on BOTH sides of the consent.  I certify that BOTH sides of the completed surgery consent have been scanned into the patient's Epic chart by myself on 10/8/2024.  Yes, I have LABELED the consent in Epic as Consent for Vascular Procedure.     For Surgical Clearances     Levels   1-3   ROUTE this encounter to [facility] Clearance Pool (AND)   [facility] Surgery Coordinator Pool     Level   4   ROUTE this encounter to [facility] Surgery Coordinator Pool       HYDRATION CLEARANCES   ONLY ROUTE TO  [facility] Clearance Pool       Yes, I have ROUTED this encounter to [facility] Surgery Coordinator and/or [facility] Clearance Pool.

## 2024-10-08 NOTE — ASSESSMENT & PLAN NOTE
Recent questionable history of endocarditis in the setting of MSSA bacteremia.  On examination I did not appreciate significant murmur.  Recent NIGEL showed small mobile fibrinous strand density on the posterior mitral valve leaflet with mild mitral valve regurgitation.  Has been afebrile recently.  Has been able to tolerate dialysis.    -- With continued current therapy and consider repeat echocardiogram in 2 to 3 months if he is having any symptoms.

## 2024-10-09 ENCOUNTER — PREP FOR PROCEDURE (OUTPATIENT)
Dept: VASCULAR SURGERY | Facility: CLINIC | Age: 74
End: 2024-10-09

## 2024-10-09 NOTE — TELEPHONE ENCOUNTER
"Operative Scheduling Information:     Hospital:  [place] [facility]     Physician:  Quinn     Surgery: Creation of right upper extremity AV graft     Urgency:  Standard     Level:  Level 3: Outpatients to be scheduled for elective surgery than can be delayed up to 4 weeks without reasonable expectation of detriment to patient     Case Length:  3 hours     Post-op Bed:  Outpatient     OR Table:  Standard     Equipment Needs:  Product/Implant: Tapered [place] graft     Medication Instructions:  Eliquis:  Hold for 2 days prior to procedure (if okay with cardiology\".  Of note patient will require cardiac restratification/clearance from cardiology secondary to recent history of mitral valve endocarditis.     Hydration:  No     Contrast Allergy:  no      "

## 2024-10-09 NOTE — TELEPHONE ENCOUNTER
[facility] is returning Yoselin's call. I was able to get in touch with Yoselin to further discuss scheduling surgery for .

## 2024-10-09 NOTE — TELEPHONE ENCOUNTER
Cardio OV - Dr. Barkley 10/8/24  1. Preop cardiovascular exam  Assessment & Plan:     RELEVANT FINDINGS    Needs right AV fistula/graft creation for hemodialysis.  Has no absolute contraindication for undergoing this procedure.  He is noted to be in atrial fibrillation.  Though his notes mention he has chronic A-fib previous EKGs at [facility] showed sinus tachycardia with PACs.  Suspect atrial fibrillation is more paroxysmal than chronic.  Recent echocardiogram demonstrated preserved left ventricular function.  There was no evidence of left atrial appendage thrombus.   CURRENT RELEVANT MEDICATIONS Is on amiodarone 100 mg daily.  He is on Eliquis 5 mg twice daily.   GOALS Minimize risk for CAD relating to planned procedure.   MEDICATION CHANGES Can undergo planned surgery without further cardiac testing.  Given high risk for bleeding okay to hold Eliquis 48 hours before the procedure.   OTHER RECOMMENDATIONS -- May proceed with planned surgery.  -- May use periprocedural IV beta-blocker to control heart rate to keep systolic blood pressures consistently at or less than 100 mmHg.  -- Needs chronic care for atrial fibrillation.  -- I am advising him to increase the dose of amiodarone to 200 mg once daily for now.  -- I am arranging him to undergo 2-week extended Holter monitor to analyze his cardiac rhythm and we will consider rate control medications or may be 2-3 times weekly digoxin to control heart rate if he is tachycardic.  -- Advising to keep an eye on symptoms and report to us if he develops any palpitations or passing out or near passing out episodes or has worsening shortness of breath with physical activity.  -- Owing to his comorbidities would not consider him for cardioversion at this time.

## 2024-10-09 NOTE — TELEPHONE ENCOUNTER
Verified patient's insurance   CONFIRMED - Patient's insurance is Medicare  Is patient requesting a call when authorization has been obtained? Patient did not request a call.    Surgery Date: 10/23/24  Primary Surgeon: ADRIANA Major/ Olivia Darby (NPI: [id number])  Assisting Surgeon: Not Applicable (N/A)  Facility: [facility] (Tax: [id number] / NPI: [id number])  Inpatient / Outpatient: Outpatient  Level: 3    Clearance Received: Yes, Cardiology .Dr. Barkley 10/8/24  Consent Received: Yes, scanned into Epic on 10/8/24.  Medication Hold / Last Dose:  Hold Eliquis 2 days prior, last dose 10/20/24  IR Notified: Not Applicable (N/A)  Rep. Notified: Not Applicable (N/A)  Equipment Needs: Not Applicable (N/A)  Vas Lab Requested: Not Applicable (N/A)  Patient Contacted: 10/9/24    Diagnosis: N18.6, Z99.2  Procedure/ CPT Code(s): (AV) Arteriovenous Fistula // CPT: 25771    For varicose vein related procedures:   Last LEVDR: Not Applicable (N/A)  CEAP Classification: Not Applicable (N/A)  VCSS: Not Applicable (N/A)    Post Operative Date/ Time: 11/12/24 , 230p [facility] with Olivia Darby (NPI: [id number])     *Please review medication hold(s), PATs, and check H&P with patient.*  PATIENT WAS MAILED SURGERY/SHOWERING/DISCHARGE/COVID INSTRUCTIONS AFTER REVIEWING WITH THEM VIA PHONE CALL.

## 2024-10-16 NOTE — PRE-PROCEDURE INSTRUCTIONS
Pre-Surgery Instructions:   Medication Instructions    Acetaminophen 500 MG Uses PRN- OK to take day of surgery    amiodarone 200 mg tablet Take as directed    B Complex-C-Folic Acid (Lissa-Geno) TABS Stop taking 7 days prior to surgery.    Cholecalciferol (Vitamin D3) 50 MCG (2000 UT) capsule Stop taking 7 days prior to surgery.    cholestyramine light 4 g POWD Take as directed    Eliquis 5 MG Hold 2 days prior to procedure per surgeon. LC 10/20/24    famotidine (PEPCID) 20 mg tablet Take as directed    folic acid (FOLVITE) 1 mg tablet Take as directed    ondansetron (ZOFRAN-ODT) 8 mg disintegrating tablet Uses PRN- OK to take day of surgery    sevelamer carbonate (RENVELA) 800 mg tablet Take as directed        Medication instructions for day surgery reviewed. Please use only a sip of water to take your instructed medications. Avoid all over the counter vitamins, supplements and NSAIDS for one week prior to surgery per anesthesia guidelines. Tylenol is ok to take as needed.     You will receive a call one business day prior to surgery with an arrival time and hospital directions. If your surgery is scheduled on a Monday, the hospital will be calling you on the Friday prior to your surgery. If you have not heard from anyone by 8pm, please call the hospital supervisor through the hospital  at [phone]. ([place] [phone] or [place] [phone]).    Do not eat or drink anything after midnight the night before your surgery, including candy, mints, lifesavers, or chewing gum. Do not drink alcohol 24hrs before your surgery. Try not to smoke at least 24hrs before your surgery.       Follow the pre surgery showering instructions as listed in the “My Surgical Experience Booklet” or otherwise provided by your surgeon's office. Do not use a blade to shave the surgical area 1 week before surgery. It is okay to use a clean electric clippers up to 24 hours before surgery. Do not apply any lotions, creams,  including makeup, cologne, deodorant, or perfumes after showering on the day of your surgery. Do not use dry shampoo, hair spray, hair gel, or any type of hair products.     No contact lenses, eye make-up, or artificial eyelashes. Remove nail polish, including gel polish, and any artificial, gel, or acrylic nails if possible. Remove all jewelry including rings and body piercing jewelry.     Wear causal clothing that is easy to take on and off. Consider your type of surgery.    Keep any valuables, jewelry, piercings at home. Please bring any specially ordered equipment (sling, braces) if indicated.    Arrange for a responsible person to drive you to and from the hospital on the day of your surgery. Please confirm the visitor policy for the day of your procedure when you receive your phone call with an arrival time.     Call the surgeon's office with any new illnesses, exposures, or additional questions prior to surgery.    Please reference your “My Surgical Experience Booklet” for additional information to prepare for your upcoming surgery.

## 2024-10-18 NOTE — TELEPHONE ENCOUNTER
ZELDAOM on 10/18/24 in regards of patient med hold for upcoming procedure on 10/23/24 with Dr. Ball. Med hold on Eliquis 2 days prior to procedure, last dose on 10/20/24.

## 2024-10-19 ENCOUNTER — APPOINTMENT (OUTPATIENT)
Dept: LAB | Facility: CLINIC | Age: 74
DRG: 252 | End: 2024-10-19
Payer: MEDICARE

## 2024-10-19 DIAGNOSIS — N18.6 ESRD (END STAGE RENAL DISEASE) ON DIALYSIS (HCC): ICD-10-CM

## 2024-10-19 DIAGNOSIS — Z99.2 ESRD (END STAGE RENAL DISEASE) ON DIALYSIS (HCC): ICD-10-CM

## 2024-10-19 LAB
ANION GAP SERPL CALCULATED.3IONS-SCNC: 11 MMOL/L (ref 4–13)
BUN SERPL-MCNC: 19 MG/DL (ref 5–25)
CALCIUM SERPL-MCNC: 9.5 MG/DL (ref 8.4–10.2)
CHLORIDE SERPL-SCNC: 99 MMOL/L (ref 96–108)
CO2 SERPL-SCNC: 31 MMOL/L (ref 21–32)
CREAT SERPL-MCNC: 4.51 MG/DL (ref 0.6–1.3)
ERYTHROCYTE [DISTWIDTH] IN BLOOD BY AUTOMATED COUNT: 18.1 % (ref 11.6–15.1)
GFR SERPL CREATININE-BSD FRML MDRD: 11 ML/MIN/1.73SQ M
GLUCOSE P FAST SERPL-MCNC: 130 MG/DL (ref 65–99)
HCT VFR BLD AUTO: 33.9 % (ref 36.5–49.3)
HGB BLD-MCNC: 10 G/DL (ref 12–17)
INR PPP: 1.74 (ref 0.85–1.19)
MCH RBC QN AUTO: 29.1 PG (ref 26.8–34.3)
MCHC RBC AUTO-ENTMCNC: 29.5 G/DL (ref 31.4–37.4)
MCV RBC AUTO: 99 FL (ref 82–98)
PLATELET # BLD AUTO: 231 THOUSANDS/UL (ref 149–390)
PMV BLD AUTO: 10.3 FL (ref 8.9–12.7)
POTASSIUM SERPL-SCNC: 3.3 MMOL/L (ref 3.5–5.3)
PROTHROMBIN TIME: 20.5 SECONDS (ref 12.3–15)
RBC # BLD AUTO: 3.44 MILLION/UL (ref 3.88–5.62)
SODIUM SERPL-SCNC: 141 MMOL/L (ref 135–147)
WBC # BLD AUTO: 4.33 THOUSAND/UL (ref 4.31–10.16)

## 2024-10-19 PROCEDURE — 86901 BLOOD TYPING SEROLOGIC RH(D): CPT | Performed by: SURGERY

## 2024-10-19 PROCEDURE — 85610 PROTHROMBIN TIME: CPT

## 2024-10-19 PROCEDURE — 80048 BASIC METABOLIC PNL TOTAL CA: CPT

## 2024-10-19 PROCEDURE — 36415 COLL VENOUS BLD VENIPUNCTURE: CPT

## 2024-10-19 PROCEDURE — 86850 RBC ANTIBODY SCREEN: CPT | Performed by: SURGERY

## 2024-10-19 PROCEDURE — 85027 COMPLETE CBC AUTOMATED: CPT

## 2024-10-19 PROCEDURE — 86900 BLOOD TYPING SEROLOGIC ABO: CPT | Performed by: SURGERY

## 2024-10-20 ENCOUNTER — LAB REQUISITION (OUTPATIENT)
Dept: LAB | Facility: HOSPITAL | Age: 74
End: 2024-10-20
Payer: MEDICARE

## 2024-10-20 DIAGNOSIS — N18.6 END STAGE RENAL DISEASE (HCC): ICD-10-CM

## 2024-10-20 DIAGNOSIS — Z99.2 DEPENDENCE ON RENAL DIALYSIS (HCC): ICD-10-CM

## 2024-10-20 LAB
ABO GROUP BLD: NORMAL
BLD GP AB SCN SERPL QL: NEGATIVE
RH BLD: POSITIVE
SPECIMEN EXPIRATION DATE: NORMAL

## 2024-10-21 ENCOUNTER — ANESTHESIA EVENT (OUTPATIENT)
Dept: PERIOP | Facility: HOSPITAL | Age: 74
DRG: 252 | End: 2024-10-21
Payer: MEDICARE

## 2024-10-23 ENCOUNTER — HOSPITAL ENCOUNTER (OUTPATIENT)
Facility: HOSPITAL | Age: 74
Setting detail: OUTPATIENT SURGERY
Discharge: HOME | DRG: 252 | End: 2024-10-23
Attending: SURGERY | Admitting: SURGERY
Payer: MEDICARE

## 2024-10-23 ENCOUNTER — APPOINTMENT (OUTPATIENT)
Dept: RADIOLOGY | Facility: HOSPITAL | Age: 74
DRG: 252 | End: 2024-10-23
Payer: MEDICARE

## 2024-10-23 ENCOUNTER — ANESTHESIA (OUTPATIENT)
Dept: PERIOP | Facility: HOSPITAL | Age: 74
DRG: 252 | End: 2024-10-23
Payer: MEDICARE

## 2024-10-23 VITALS
WEIGHT: 212.52 LBS | HEIGHT: 70 IN | TEMPERATURE: 97.3 F | BODY MASS INDEX: 30.43 KG/M2 | HEART RATE: 91 BPM | SYSTOLIC BLOOD PRESSURE: 120 MMHG | DIASTOLIC BLOOD PRESSURE: 62 MMHG | OXYGEN SATURATION: 94 % | RESPIRATION RATE: 18 BRPM

## 2024-10-23 DIAGNOSIS — N18.6 ESRD (END STAGE RENAL DISEASE) ON DIALYSIS (HCC): Primary | ICD-10-CM

## 2024-10-23 DIAGNOSIS — Z99.2 ESRD (END STAGE RENAL DISEASE) ON DIALYSIS (HCC): Primary | ICD-10-CM

## 2024-10-23 LAB
ABO GROUP BLD: NORMAL
POTASSIUM SERPL-SCNC: 3.2 MMOL/L (ref 3.5–5.3)
RH BLD: POSITIVE

## 2024-10-23 PROCEDURE — 71045 X-RAY EXAM CHEST 1 VIEW: CPT

## 2024-10-23 PROCEDURE — 84132 ASSAY OF SERUM POTASSIUM: CPT | Performed by: SURGERY

## 2024-10-23 PROCEDURE — 36819 AV FUSE UPPR ARM BASILIC: CPT | Performed by: SURGERY

## 2024-10-23 PROCEDURE — 03170ZD BYPASS RIGHT BRACHIAL ARTERY TO UPPER ARM VEIN, OPEN APPROACH: ICD-10-PCS | Performed by: SURGERY

## 2024-10-23 RX ORDER — HYDROMORPHONE HCL/PF 1 MG/ML
0.5 SYRINGE (ML) INJECTION
Status: DISCONTINUED | OUTPATIENT
Start: 2024-10-23 | End: 2024-10-23 | Stop reason: HOSPADM

## 2024-10-23 RX ORDER — ONDANSETRON 2 MG/ML
INJECTION INTRAMUSCULAR; INTRAVENOUS AS NEEDED
Status: DISCONTINUED | OUTPATIENT
Start: 2024-10-23 | End: 2024-10-23

## 2024-10-23 RX ORDER — SODIUM CHLORIDE, SODIUM LACTATE, POTASSIUM CHLORIDE, CALCIUM CHLORIDE 600; 310; 30; 20 MG/100ML; MG/100ML; MG/100ML; MG/100ML
125 INJECTION, SOLUTION INTRAVENOUS CONTINUOUS
Status: DISCONTINUED | OUTPATIENT
Start: 2024-10-23 | End: 2024-10-23

## 2024-10-23 RX ORDER — TRAMADOL HYDROCHLORIDE 50 MG/1
50 TABLET ORAL EVERY 6 HOURS PRN
Status: DISCONTINUED | OUTPATIENT
Start: 2024-10-23 | End: 2024-10-23 | Stop reason: HOSPADM

## 2024-10-23 RX ORDER — ALBUTEROL SULFATE 90 UG/1
2 INHALANT RESPIRATORY (INHALATION) EVERY 4 HOURS PRN
Status: DISCONTINUED | OUTPATIENT
Start: 2024-10-23 | End: 2024-10-23 | Stop reason: HOSPADM

## 2024-10-23 RX ORDER — TRAMADOL HYDROCHLORIDE 50 MG/1
50 TABLET ORAL EVERY 8 HOURS PRN
Qty: 30 TABLET | Refills: 0 | Status: SHIPPED | OUTPATIENT
Start: 2024-10-23 | End: 2024-11-02

## 2024-10-23 RX ORDER — PHENYLEPHRINE HCL IN 0.9% NACL 1 MG/10 ML
SYRINGE (ML) INTRAVENOUS AS NEEDED
Status: DISCONTINUED | OUTPATIENT
Start: 2024-10-23 | End: 2024-10-23

## 2024-10-23 RX ORDER — HYDROMORPHONE HCL/PF 1 MG/ML
SYRINGE (ML) INJECTION AS NEEDED
Status: DISCONTINUED | OUTPATIENT
Start: 2024-10-23 | End: 2024-10-23

## 2024-10-23 RX ORDER — BUPIVACAINE HYDROCHLORIDE 2.5 MG/ML
INJECTION, SOLUTION EPIDURAL; INFILTRATION; INTRACAUDAL AS NEEDED
Status: DISCONTINUED | OUTPATIENT
Start: 2024-10-23 | End: 2024-10-23 | Stop reason: HOSPADM

## 2024-10-23 RX ORDER — FENTANYL CITRATE/PF 50 MCG/ML
25 SYRINGE (ML) INJECTION
Status: DISCONTINUED | OUTPATIENT
Start: 2024-10-23 | End: 2024-10-23 | Stop reason: HOSPADM

## 2024-10-23 RX ORDER — DEXAMETHASONE SODIUM PHOSPHATE 10 MG/ML
INJECTION, SOLUTION INTRAMUSCULAR; INTRAVENOUS AS NEEDED
Status: DISCONTINUED | OUTPATIENT
Start: 2024-10-23 | End: 2024-10-23

## 2024-10-23 RX ORDER — SODIUM CHLORIDE 9 MG/ML
INJECTION, SOLUTION INTRAVENOUS CONTINUOUS PRN
Status: DISCONTINUED | OUTPATIENT
Start: 2024-10-23 | End: 2024-10-23

## 2024-10-23 RX ORDER — LIDOCAINE HYDROCHLORIDE AND EPINEPHRINE 10; 10 MG/ML; UG/ML
INJECTION, SOLUTION INFILTRATION; PERINEURAL AS NEEDED
Status: DISCONTINUED | OUTPATIENT
Start: 2024-10-23 | End: 2024-10-23 | Stop reason: HOSPADM

## 2024-10-23 RX ORDER — HEPARIN SODIUM 5000 [USP'U]/ML
INJECTION, SOLUTION INTRAVENOUS; SUBCUTANEOUS AS NEEDED
Status: DISCONTINUED | OUTPATIENT
Start: 2024-10-23 | End: 2024-10-23

## 2024-10-23 RX ORDER — ACETAMINOPHEN 160 MG/5ML
650 SUSPENSION ORAL EVERY 4 HOURS PRN
Status: DISCONTINUED | OUTPATIENT
Start: 2024-10-23 | End: 2024-10-23 | Stop reason: HOSPADM

## 2024-10-23 RX ORDER — EPHEDRINE SULFATE 50 MG/ML
INJECTION INTRAVENOUS AS NEEDED
Status: DISCONTINUED | OUTPATIENT
Start: 2024-10-23 | End: 2024-10-23

## 2024-10-23 RX ORDER — PROPOFOL 10 MG/ML
INJECTION, EMULSION INTRAVENOUS AS NEEDED
Status: DISCONTINUED | OUTPATIENT
Start: 2024-10-23 | End: 2024-10-23

## 2024-10-23 RX ORDER — CHLORHEXIDINE GLUCONATE ORAL RINSE 1.2 MG/ML
15 SOLUTION DENTAL ONCE
Status: COMPLETED | OUTPATIENT
Start: 2024-10-23 | End: 2024-10-23

## 2024-10-23 RX ORDER — SENNOSIDES 8.6 MG
650 CAPSULE ORAL EVERY 8 HOURS PRN
Qty: 30 TABLET | Refills: 0 | Status: SHIPPED | OUTPATIENT
Start: 2024-10-23

## 2024-10-23 RX ORDER — ONDANSETRON 2 MG/ML
4 INJECTION INTRAMUSCULAR; INTRAVENOUS ONCE AS NEEDED
Status: DISCONTINUED | OUTPATIENT
Start: 2024-10-23 | End: 2024-10-23 | Stop reason: HOSPADM

## 2024-10-23 RX ORDER — MAGNESIUM HYDROXIDE 1200 MG/15ML
LIQUID ORAL AS NEEDED
Status: DISCONTINUED | OUTPATIENT
Start: 2024-10-23 | End: 2024-10-23 | Stop reason: HOSPADM

## 2024-10-23 RX ORDER — CEFAZOLIN SODIUM 2 G/50ML
2000 SOLUTION INTRAVENOUS ONCE
Status: COMPLETED | OUTPATIENT
Start: 2024-10-23 | End: 2024-10-23

## 2024-10-23 RX ORDER — LIDOCAINE HYDROCHLORIDE 20 MG/ML
INJECTION, SOLUTION EPIDURAL; INFILTRATION; INTRACAUDAL; PERINEURAL AS NEEDED
Status: DISCONTINUED | OUTPATIENT
Start: 2024-10-23 | End: 2024-10-23

## 2024-10-23 RX ORDER — FENTANYL CITRATE 50 UG/ML
INJECTION, SOLUTION INTRAMUSCULAR; INTRAVENOUS AS NEEDED
Status: DISCONTINUED | OUTPATIENT
Start: 2024-10-23 | End: 2024-10-23

## 2024-10-23 RX ADMIN — HEPARIN SODIUM 7000 UNITS: 5000 INJECTION, SOLUTION INTRAVENOUS; SUBCUTANEOUS at 14:35

## 2024-10-23 RX ADMIN — DEXAMETHASONE SODIUM PHOSPHATE 10 MG: 10 INJECTION, SOLUTION INTRAMUSCULAR; INTRAVENOUS at 12:54

## 2024-10-23 RX ADMIN — Medication 100 MCG: at 14:52

## 2024-10-23 RX ADMIN — ONDANSETRON 4 MG: 2 INJECTION INTRAMUSCULAR; INTRAVENOUS at 12:54

## 2024-10-23 RX ADMIN — FENTANYL CITRATE 25 MCG: 50 INJECTION, SOLUTION INTRAMUSCULAR; INTRAVENOUS at 15:34

## 2024-10-23 RX ADMIN — EPHEDRINE SULFATE 10 MG: 50 INJECTION INTRAVENOUS at 14:27

## 2024-10-23 RX ADMIN — ACETAMINOPHEN 650 MG: 650 SUSPENSION ORAL at 17:44

## 2024-10-23 RX ADMIN — PROPOFOL 90 MG: 10 INJECTION, EMULSION INTRAVENOUS at 12:53

## 2024-10-23 RX ADMIN — Medication 0.5 MG: at 13:57

## 2024-10-23 RX ADMIN — FENTANYL CITRATE 25 MCG: 50 INJECTION, SOLUTION INTRAMUSCULAR; INTRAVENOUS at 13:18

## 2024-10-23 RX ADMIN — LIDOCAINE HYDROCHLORIDE 100 MG: 20 INJECTION, SOLUTION EPIDURAL; INFILTRATION; INTRACAUDAL; PERINEURAL at 12:53

## 2024-10-23 RX ADMIN — SODIUM CHLORIDE, SODIUM LACTATE, POTASSIUM CHLORIDE, AND CALCIUM CHLORIDE 125 ML/HR: .6; .31; .03; .02 INJECTION, SOLUTION INTRAVENOUS at 11:03

## 2024-10-23 RX ADMIN — FENTANYL CITRATE 25 MCG: 50 INJECTION, SOLUTION INTRAMUSCULAR; INTRAVENOUS at 13:24

## 2024-10-23 RX ADMIN — SODIUM CHLORIDE: 9 INJECTION, SOLUTION INTRAVENOUS at 12:48

## 2024-10-23 RX ADMIN — CEFAZOLIN SODIUM 2000 MG: 2 SOLUTION INTRAVENOUS at 12:56

## 2024-10-23 RX ADMIN — FENTANYL CITRATE 25 MCG: 50 INJECTION, SOLUTION INTRAMUSCULAR; INTRAVENOUS at 12:53

## 2024-10-23 RX ADMIN — CHLORHEXIDINE GLUCONATE 15 ML: 1.2 RINSE ORAL at 11:00

## 2024-10-23 NOTE — ANESTHESIA POSTPROCEDURE EVALUATION
Post-Op Assessment Note    CV Status:  Stable  Pain Score: 0    Pain management: adequate       Mental Status:  Awake   Hydration Status:  Euvolemic   PONV Controlled:  Controlled   Airway Patency:  Patent     Post Op Vitals Reviewed: Yes    No anethesia notable event occurred.    Staff: CRNA           Last Filed PACU Vitals:  Vitals Value Taken Time   Temp 97.3    Pulse 112 10/23/24 1551   /66 10/23/24 1550   Resp 16    SpO2 95 % 10/23/24 1550   Vitals shown include unfiled device data.    Modified Lesly:  No data recorded

## 2024-10-23 NOTE — INTERVAL H&P NOTE
"H&P reviewed. After examining the patient I find no changes in the patients condition since the H&P had been written.    Vitals:    10/23/24 1046   BP: 144/70   Pulse: 84   Resp: 20   Temp: 97.5 °F (36.4 °C)   SpO2: 94%   Patient seen and examined in preop holding area.  Discussed with Alexandro that will take a look at right basilic vein intraoperatively and if of good quality and length may proceed with single stage brachiobasilic Arteriovenous fistula creation rather than AV graft.  Patient verbalized understanding and is agreeable. The Previously obtained consent was modified with Alexandro's consent to included \"possible fistula.\"  The right arm was marked as operative site.  "

## 2024-10-23 NOTE — DISCHARGE INSTR - AVS FIRST PAGE
DISCHARGE INSTRUCTIONS  DIALYSIS FISTULA SURGERY    ACTIVITY:  Limit use of the operated arm to what is necessary for the first day after surgery. On the second day after surgery, you may start to increase use of your arm as tolerated.  Avoid heavy lifting (no more than 15 lbs) for the first one week.  You should start to exercise your hand on the side of the fistula by squeezing a stress ball or a rolled-up sock. This increases blood flow in your fistula and arm so your fistula will function better.    Feel for a thrill every day. The thrill is the vibration or pulse you feel over the fistula that means the blood is flowing through it. If you cannot feel a thrill, call our office ([phone]).    DIET:   Resume your normal diet.  Good nutrition is important for healing of your incision.    DRESSING:   You may have surgical glue at your surgical site.  There are stitches present under the skin which will absorb on their own.  The glue is used to cover the incision, assist in closure, and prevent contamination. This adhesive will darken and peel away on its own within one to two weeks. Do not pick at it.  If you have a dressing over your surgical site, remove this on the second day after surgery.      INCISION:   If you do not have a dialysis catheter in place, you may shower and get your incision wet.  Wash incision daily with soap and water, but do not rub or scrub the incision; rinse thoroughly and pat dry.  You may have stitches or staples to close your incision and it is okay for these to get wet.  Do not bathe in a tub or swim for the first 4 week following surgery or if you have any open wounds.  It is normal to have mild swelling or discoloration around the incision.   If increasing redness or pain develops, call our office immediately.  Numbness in the region of the incision may occur following the surgery.  This normally improves over six to twelve months.  If you have numbness or pain in your hand,  please call our office immediately.  DO NOT put any powders, creams, ointments, or lotions on your incision.     ARM SWELLING:    Most patients have some noticeable arm swelling after surgery.  This usually disappears within a few weeks.  If swelling is present, elevate the arm whenever possible.      RESTRICTIONS:   Do NOT have blood draws, IV's, or blood pressures performed on the operated arm.    FISTULA USE:    Your fistula will not be used until it has fully matured - approximately 6 to 12 weeks. If you are using a catheter for dialysis, this will not be removed until after your fistula has matured and is being used for dialysis without any issues.    FOLLOW UP STUDIES:  A Doppler ultrasound will be performed about 5-6 weeks after surgery.  Your surgeon will arrange this at your first postoperative visit.     FOLLOW UP APPOINTMENTS:  Making and keeping follow up appointments and ultrasound tests are important to your recovery.  If you have difficulty making it to or keeping your follow up appointments, call the office.    If you have increased pain, fever >101.5, increased drainage, redness or a bad smell at your surgery site, new coldness/numbness of your arm or leg, please call us immediately and GO directly to the ER.    PLEASE CALL THE OFFICE IF YOU HAVE ANY QUESTIONS  [phone]  [phone] [address], [address], [place] [address]  [address] [place] [address]  [address] [facility], [address], [place] [address]  [address] [facility], [place] [address]  [address] [facility], [address] [place] [address]  [address] [facility], [address], [place] [address]  [address] [place] [address]  [address] [facility], 1st Floor, [address], [place] [address]  [address], [place] [address]  [address] [facility], [address], [place] [address]

## 2024-10-23 NOTE — OP NOTE
OPERATIVE REPORT  PATIENT NAME: Alexandro Jefferson    :  1950  MRN: [id number]  Pt Location: [facility] 09    SURGERY DATE: 10/23/2024    Surgeons and Role:     * Olivia Ball, DO - Primary     * Tan Warner MD - Assisting     * Ramsey Portillo DO - Assisting    Preop Diagnosis:  ESRD (end stage renal disease) on dialysis (HCC) [N18.6, Z99.2]    Post-Op Diagnosis Codes:     * ESRD (end stage renal disease) on dialysis (HCC) [N18.6, Z99.2]    Procedure(s):  Single Stage Right Brachio-basilic Arteriovenous Fistula Creation     Specimen(s):  None    Estimated Blood Loss:   50 mL    Drains:  None    Anesthesia Type:   General w/ LMA     Operative Indications:  74-year-old male with end-stage renal disease requiring hemodialysis on Monday/Wednesday/Friday with prior history of left upper extremity arteriovenous fistula which is currently nonfunctional and receiving hemodialysis through a right IJ tunneled catheter.  Based off his need for continued dialysis recommendation was made for right upper extremity arteriovenous fistula versus graft.  Based off of his basilic vein sizing on vessel mapping it was deemed appropriate to attempt single-stage brachiobasilic arterial fistula.  Risk, benefits, alternatives, description of procedure were discussed with patient in detail patient was agreeable to proceeding.    Operative Findings:  Brachial artery noted to be 4-5mm with 6-8mm basilic vein throughout its length to the AC fossa, circumferentially dissected with lateral tunneling and end-to-side anastamosis to brachial artery above the AC fossa.  Excellent thrill on completion with dopplerable radial pulse.        Complications:   None    Procedure and Technique:  Consent was obtained preoperatively.  The patient was brought to the operating room and placed on the table in supine position, all bony prominences were appropriately padded.  Preoperative antibiotics were administered.  The patient received General  LMA anesthesia.   The patient was prepped and draped in usual sterile surgical fashion.  A timeout was performed identifying patient by name, date of birth, and procedure with all in agreement.     The right basilic vein and brachial artery were evaluated prior to initiation of procedure with the use of ultrasound and the basilic vein appeared to be greater than 6 mm throughout its length from axilla to the antecubital fossa indicating appropriate conduit for a single-stage basilic vein transposition.  Procedure was initiated by the use of a #15 blade to make a longitudinal incision overlying the previously marked basilic vein in the right upper extremity.  Electrocautery was used to dissect through subcutaneous tissue until the basilic vein was identified.  Basilic vein was dissected throughout its length from antecubital fossa proximally towards the axilla.  All side branches were ligated with the use of silk suture and hemostatic clips.  Pain appeared to be appropriately distended and greater than 6 mm throughout.  Next using the same incision the brachial artery was identified just proximal to the antecubital fossa.  Electrocautery was used to dissect through subcutaneous tissue until the brachial sheath was identified.  Sharp and blunt dissection were then used to circumferentially dissect the brachial artery for a length of 4 to 5 cm.  At this point the basilic vein was transected at its distal point within the antecubital fossa and flushed with heparinized saline solution.  There appeared to be adequate length to perform a single staged basilic vein transposition with appropriate subcutaneous tunneling.  Based off this a Zoraida-Wick tunneler was used to create a subcutaneous tunnel within the lateral aspect of the right upper extremity from the brachial artery proximally towards the axilla.  Once tunnel was created the patient was then systemically heparinized with a dose of 7000 units to therapeutic range.   The basilic vein was then distended with the use of heparinized saline and a marking pen was used to emanuel the anterior surface of the vein as to maintain orientation and recognize any kinking during delivery through the tunnel.  The vein was then delivered through the previously created subcutaneous tunnel within the lateral aspect of the right upper extremity and again fully distended without any evidence of kinking or twisting.  At this point he has agreeable clamps were then used to obtain proximal distal control on the brachial artery.  A #11 blade was then used to create a arteriotomy which was extended proximally distally with the use of Morrissey scissors.  The basilic vein was then appropriately cut and beveled and sewn onto the brachial artery in an end-to-side fashion with the use of 6-0 Prolene suture which was run circumferentially.  Just prior to completion of the anastomosis flushing maneuvers were performed of both native artery and vein along with heparinized saline.  After this the anastomosis was then completed.  He has ago clamps were then released on the brachial artery.  At this point there was an excellent thrill within the basilic vein.  Doppler ultrasound was used to confirm signals distal to the anastomosis along with confirmation of multiphasic signal within the radial artery at the wrist.  Incision site was then irrigated with sterile saline solution.  Hemostasis was appropriate achieved with the use of electrocautery.  Incision was then closed with the use of 2-0 Vicryl in interrupted fashion for the deep layer.  3-0 Vicryl was used to close deep dermis and surgical staples were applied to skin incision.  Incision site was then covered with sterile Mepilex dressing.    At the completion of the procedure all sponge instrument counts were correct, patient tolerated procedure well, at completion there was again a dopplerable radial signal along with a palpable thrill throughout the length of the  basilic vein.  Patient was transferred to PACU in stable condition.    Dr. Darby was present for the entire procedure.    Patient Disposition:  PACU        Vascular Quality Initiative - Hemodialysis Access Placement    Pre-admission Information   Functional status: Restricted in physically strenuous activity but ambulatory and able to carry out work of a light or sedentary nature.     ESRD: ESRD: Hemodialysis dependent.  Current Access Type.: Tunneled Catheter    Historical Information      Previous Access: left   Access Type/Location: ACCESS TYPE: Surgical AVF  Access Location: Upper arm Cephalic.     Procedure Information      Status: Outpatient     Side:right      Anesthesia: General     Access Type: Access Type: Surgical AVF Inflow Artery is:  Brachial, Upper Arm  Intraoperative Artery taget diameter is: 5mm.  Outflow Vein is: Basilic, Upper Arm.  Intraoperative Vein target diameter is: 7mm  Anastomosis configuration is: End to Side.  Cocomitant Procedure performed-: None    Completion Fistulogram: no     Preop ARTERIAL evaluation and/or treatment: duplex    Preop VENOUS evaluation and/or treatment: ultrasound mapping    *Obtain Target Diameters from study    Post op Information     Discharge Status: Home    Post op Complications: None      SIGNATURE: Ramsey Portillo DO  DATE: October 23, 2024  TIME: 3:49 PM

## 2024-10-23 NOTE — ANESTHESIA PREPROCEDURE EVALUATION
Procedure:  CREATION FISTULA ARTERIOVENOUS (AV) GRAFT (Right: Arm Upper)    Relevant Problems   CARDIO   (+) AVF (arteriovenous fistula) (HCC)   (+) Diabetes mellitus with peripheral circulatory disorder (HCC)   (+) Paroxysmal atrial fibrillation (HCC)      /RENAL   (+) ESRD (end stage renal disease) on dialysis (HCC)      Cardiovascular/Peripheral Vascular   (+) Chronic pulmonary heart disease (HCC)      Other   (+) History of endocarditis   (+) On amiodarone therapy        Physical Exam    Airway    Mallampati score: II         Dental       Cardiovascular  Rhythm: regular    Pulmonary   Decreased breath sounds    Other Findings        Anesthesia Plan  ASA Score- 4     Anesthesia Type- general with ASA Monitors.         Additional Monitors:     Airway Plan: LMA.    Comment: Aortic Valve   The aortic valve is trileaflet. There is trace regurgitation. There is moderate stenosis (AMY 1.3, Peak gradient 3m/s, Mean gradient 34 mmHg, DI 0.4).   .       Plan Factors-Exercise tolerance (METS): <4 METS.    Chart reviewed. EKG reviewed.  Existing labs reviewed. Patient summary reviewed.          Obstructive sleep apnea risk education given perioperatively.        Induction- intravenous.    Postoperative Plan-     Perioperative Resuscitation Plan - Level 1 - Full Code.       Informed Consent- Anesthetic plan and risks discussed with patient.  I personally reviewed this patient with the CRNA. Discussed and agreed on the Anesthesia Plan with the CRNA..

## 2024-10-24 NOTE — ANESTHESIA POSTPROCEDURE EVALUATION
Post-Op Assessment Note            No anethesia notable event occurred.            Last Filed PACU Vitals:  Vitals Value Taken Time   Temp 97.5 °F (36.4 °C) 10/23/24 1634   Pulse 105 10/23/24 1634   /75 10/23/24 1634   Resp 19 10/23/24 1634   SpO2 93 % 10/23/24 1634       Modified Lesly:  Activity: 2 (10/23/2024  4:34 PM)  Respiration: 2 (10/23/2024  4:34 PM)  Circulation: 2 (10/23/2024  4:34 PM)  Consciousness: 1 (10/23/2024  4:34 PM)  Oxygen Saturation: 2 (10/23/2024  4:34 PM)  Modified Lesly Score: 9 (10/23/2024  4:34 PM)         The patient had surgery yesterday but he is asking about the catheter. The patient's wife called asking for you to call her back. Sharri Vásquez

## 2024-10-25 ENCOUNTER — HOSPITAL ENCOUNTER (INPATIENT)
Facility: HOSPITAL | Age: 74
LOS: 1 days | Discharge: HOME | DRG: 252 | End: 2024-10-26
Admitting: INTERNAL MEDICINE
Payer: MEDICARE

## 2024-10-25 ENCOUNTER — ANESTHESIA (INPATIENT)
Dept: PERIOP | Facility: HOSPITAL | Age: 74
DRG: 252 | End: 2024-10-25
Payer: MEDICARE

## 2024-10-25 ENCOUNTER — APPOINTMENT (EMERGENCY)
Dept: CT IMAGING | Facility: HOSPITAL | Age: 74
DRG: 252 | End: 2024-10-25
Payer: MEDICARE

## 2024-10-25 ENCOUNTER — ANESTHESIA EVENT (INPATIENT)
Dept: PERIOP | Facility: HOSPITAL | Age: 74
DRG: 252 | End: 2024-10-25
Payer: MEDICARE

## 2024-10-25 ENCOUNTER — APPOINTMENT (EMERGENCY)
Dept: NON INVASIVE DIAGNOSTICS | Facility: HOSPITAL | Age: 74
DRG: 252 | End: 2024-10-25
Payer: MEDICARE

## 2024-10-25 DIAGNOSIS — T82.848A PAIN FROM A/V FISTULA (HCC): Primary | ICD-10-CM

## 2024-10-25 DIAGNOSIS — Z99.2 ESRD (END STAGE RENAL DISEASE) ON DIALYSIS (HCC): ICD-10-CM

## 2024-10-25 DIAGNOSIS — N18.6 ESRD (END STAGE RENAL DISEASE) ON DIALYSIS (HCC): ICD-10-CM

## 2024-10-25 PROBLEM — K27.9 PEPTIC ULCER DISEASE: Status: ACTIVE | Noted: 2024-10-25

## 2024-10-25 PROBLEM — E11.9 DIABETES (HCC): Status: ACTIVE | Noted: 2024-10-25

## 2024-10-25 PROBLEM — K52.9 CHRONIC DIARRHEA: Status: ACTIVE | Noted: 2024-10-25

## 2024-10-25 LAB
ABO GROUP BLD: NORMAL
ALBUMIN SERPL BCG-MCNC: 3.1 G/DL (ref 3.5–5)
ALP SERPL-CCNC: 111 U/L (ref 34–104)
ALT SERPL W P-5'-P-CCNC: <3 U/L (ref 7–52)
ANION GAP SERPL CALCULATED.3IONS-SCNC: 13 MMOL/L (ref 4–13)
APTT PPP: 36 SECONDS (ref 23–34)
AST SERPL W P-5'-P-CCNC: 13 U/L (ref 13–39)
BASOPHILS # BLD AUTO: 0.01 THOUSANDS/ΜL (ref 0–0.1)
BASOPHILS NFR BLD AUTO: 0 % (ref 0–1)
BILIRUB SERPL-MCNC: 0.31 MG/DL (ref 0.2–1)
BLD GP AB SCN SERPL QL: NEGATIVE
BUN SERPL-MCNC: 40 MG/DL (ref 5–25)
CALCIUM ALBUM COR SERPL-MCNC: 9.9 MG/DL (ref 8.3–10.1)
CALCIUM SERPL-MCNC: 9.2 MG/DL (ref 8.4–10.2)
CHLORIDE SERPL-SCNC: 101 MMOL/L (ref 96–108)
CO2 SERPL-SCNC: 24 MMOL/L (ref 21–32)
CREAT SERPL-MCNC: 7.11 MG/DL (ref 0.6–1.3)
EOSINOPHIL # BLD AUTO: 0.08 THOUSAND/ΜL (ref 0–0.61)
EOSINOPHIL NFR BLD AUTO: 1 % (ref 0–6)
ERYTHROCYTE [DISTWIDTH] IN BLOOD BY AUTOMATED COUNT: 17.8 % (ref 11.6–15.1)
GFR SERPL CREATININE-BSD FRML MDRD: 6 ML/MIN/1.73SQ M
GLUCOSE SERPL-MCNC: 105 MG/DL (ref 65–140)
HCT VFR BLD AUTO: 36 % (ref 36.5–49.3)
HGB BLD-MCNC: 11.1 G/DL (ref 12–17)
IMM GRANULOCYTES # BLD AUTO: 0.01 THOUSAND/UL (ref 0–0.2)
IMM GRANULOCYTES NFR BLD AUTO: 0 % (ref 0–2)
INR PPP: 1.54 (ref 0.85–1.19)
LYMPHOCYTES # BLD AUTO: 0.71 THOUSANDS/ΜL (ref 0.6–4.47)
LYMPHOCYTES NFR BLD AUTO: 12 % (ref 14–44)
MCH RBC QN AUTO: 29.2 PG (ref 26.8–34.3)
MCHC RBC AUTO-ENTMCNC: 30.8 G/DL (ref 31.4–37.4)
MCV RBC AUTO: 95 FL (ref 82–98)
MONOCYTES # BLD AUTO: 0.51 THOUSAND/ΜL (ref 0.17–1.22)
MONOCYTES NFR BLD AUTO: 8 % (ref 4–12)
NEUTROPHILS # BLD AUTO: 4.73 THOUSANDS/ΜL (ref 1.85–7.62)
NEUTS SEG NFR BLD AUTO: 79 % (ref 43–75)
NRBC BLD AUTO-RTO: 0 /100 WBCS
PLATELET # BLD AUTO: 200 THOUSANDS/UL (ref 149–390)
PMV BLD AUTO: 9.8 FL (ref 8.9–12.7)
POTASSIUM SERPL-SCNC: 3.5 MMOL/L (ref 3.5–5.3)
PROT SERPL-MCNC: 6 G/DL (ref 6.4–8.4)
PROTHROMBIN TIME: 18.5 SECONDS (ref 12.3–15)
RBC # BLD AUTO: 3.8 MILLION/UL (ref 3.88–5.62)
RH BLD: POSITIVE
SODIUM SERPL-SCNC: 138 MMOL/L (ref 135–147)
SPECIMEN EXPIRATION DATE: NORMAL
WBC # BLD AUTO: 6.05 THOUSAND/UL (ref 4.31–10.16)

## 2024-10-25 PROCEDURE — 93990 DOPPLER FLOW TESTING: CPT

## 2024-10-25 PROCEDURE — 99223 1ST HOSP IP/OBS HIGH 75: CPT | Performed by: INTERNAL MEDICINE

## 2024-10-25 PROCEDURE — 99284 EMERGENCY DEPT VISIT MOD MDM: CPT

## 2024-10-25 PROCEDURE — 80053 COMPREHEN METABOLIC PANEL: CPT

## 2024-10-25 PROCEDURE — 36415 COLL VENOUS BLD VENIPUNCTURE: CPT

## 2024-10-25 PROCEDURE — 96365 THER/PROPH/DIAG IV INF INIT: CPT

## 2024-10-25 PROCEDURE — 86901 BLOOD TYPING SEROLOGIC RH(D): CPT

## 2024-10-25 PROCEDURE — 05L90ZZ OCCLUSION OF RIGHT BRACHIAL VEIN, OPEN APPROACH: ICD-10-PCS | Performed by: SURGERY

## 2024-10-25 PROCEDURE — 99222 1ST HOSP IP/OBS MODERATE 55: CPT | Performed by: INTERNAL MEDICINE

## 2024-10-25 PROCEDURE — 86900 BLOOD TYPING SEROLOGIC ABO: CPT

## 2024-10-25 PROCEDURE — 85730 THROMBOPLASTIN TIME PARTIAL: CPT

## 2024-10-25 PROCEDURE — 93990 DOPPLER FLOW TESTING: CPT | Performed by: SURGERY

## 2024-10-25 PROCEDURE — 37607 LIG/BANDING ANGIOACS AV FSTL: CPT | Performed by: SURGERY

## 2024-10-25 PROCEDURE — 85610 PROTHROMBIN TIME: CPT

## 2024-10-25 PROCEDURE — 99285 EMERGENCY DEPT VISIT HI MDM: CPT

## 2024-10-25 PROCEDURE — 99024 POSTOP FOLLOW-UP VISIT: CPT | Performed by: SURGERY

## 2024-10-25 PROCEDURE — 86850 RBC ANTIBODY SCREEN: CPT

## 2024-10-25 PROCEDURE — 85025 COMPLETE CBC W/AUTO DIFF WBC: CPT

## 2024-10-25 PROCEDURE — 73206 CT ANGIO UPR EXTRM W/O&W/DYE: CPT

## 2024-10-25 RX ORDER — HYDROMORPHONE HCL/PF 1 MG/ML
0.2 SYRINGE (ML) INJECTION
Status: DISCONTINUED | OUTPATIENT
Start: 2024-10-25 | End: 2024-10-26 | Stop reason: HOSPADM

## 2024-10-25 RX ORDER — CEFAZOLIN SODIUM 2 G/50ML
2000 SOLUTION INTRAVENOUS ONCE
Status: DISCONTINUED | OUTPATIENT
Start: 2024-10-25 | End: 2024-10-25

## 2024-10-25 RX ORDER — CALCITRIOL 0.25 UG/1
1.25 CAPSULE, LIQUID FILLED ORAL 3 TIMES WEEKLY
Status: DISCONTINUED | OUTPATIENT
Start: 2024-10-28 | End: 2024-10-26 | Stop reason: HOSPADM

## 2024-10-25 RX ORDER — ACETAMINOPHEN 325 MG/1
975 TABLET ORAL EVERY 6 HOURS PRN
Status: DISCONTINUED | OUTPATIENT
Start: 2024-10-25 | End: 2024-10-26 | Stop reason: HOSPADM

## 2024-10-25 RX ORDER — CALCIUM CARBONATE 500 MG/1
1000 TABLET, CHEWABLE ORAL DAILY PRN
Status: DISCONTINUED | OUTPATIENT
Start: 2024-10-25 | End: 2024-10-26 | Stop reason: HOSPADM

## 2024-10-25 RX ORDER — SEVELAMER HYDROCHLORIDE 800 MG/1
2400 TABLET, FILM COATED ORAL
Status: DISCONTINUED | OUTPATIENT
Start: 2024-10-25 | End: 2024-10-26 | Stop reason: HOSPADM

## 2024-10-25 RX ORDER — CHLORHEXIDINE GLUCONATE ORAL RINSE 1.2 MG/ML
15 SOLUTION DENTAL ONCE
Status: DISCONTINUED | OUTPATIENT
Start: 2024-10-25 | End: 2024-10-25

## 2024-10-25 RX ORDER — MAGNESIUM HYDROXIDE/ALUMINUM HYDROXICE/SIMETHICONE 120; 1200; 1200 MG/30ML; MG/30ML; MG/30ML
30 SUSPENSION ORAL EVERY 6 HOURS PRN
Status: DISCONTINUED | OUTPATIENT
Start: 2024-10-25 | End: 2024-10-26 | Stop reason: HOSPADM

## 2024-10-25 RX ORDER — AMIODARONE HYDROCHLORIDE 200 MG/1
200 TABLET ORAL DAILY
Status: DISCONTINUED | OUTPATIENT
Start: 2024-10-25 | End: 2024-10-26 | Stop reason: HOSPADM

## 2024-10-25 RX ORDER — LIDOCAINE HYDROCHLORIDE 10 MG/ML
INJECTION, SOLUTION EPIDURAL; INFILTRATION; INTRACAUDAL; PERINEURAL AS NEEDED
Status: DISCONTINUED | OUTPATIENT
Start: 2024-10-25 | End: 2024-10-25 | Stop reason: HOSPADM

## 2024-10-25 RX ORDER — PROPOFOL 10 MG/ML
INJECTION, EMULSION INTRAVENOUS CONTINUOUS PRN
Status: DISCONTINUED | OUTPATIENT
Start: 2024-10-25 | End: 2024-10-25

## 2024-10-25 RX ORDER — SEVELAMER HYDROCHLORIDE 800 MG/1
2400 TABLET, FILM COATED ORAL
Status: CANCELLED | OUTPATIENT
Start: 2024-10-25

## 2024-10-25 RX ORDER — FENTANYL CITRATE 50 UG/ML
INJECTION, SOLUTION INTRAMUSCULAR; INTRAVENOUS AS NEEDED
Status: DISCONTINUED | OUTPATIENT
Start: 2024-10-25 | End: 2024-10-25

## 2024-10-25 RX ORDER — FAMOTIDINE 20 MG/1
20 TABLET, FILM COATED ORAL
Status: DISCONTINUED | OUTPATIENT
Start: 2024-10-25 | End: 2024-10-26 | Stop reason: HOSPADM

## 2024-10-25 RX ORDER — ACETAMINOPHEN 10 MG/ML
1000 INJECTION, SOLUTION INTRAVENOUS ONCE
Status: COMPLETED | OUTPATIENT
Start: 2024-10-25 | End: 2024-10-25

## 2024-10-25 RX ORDER — ONDANSETRON 2 MG/ML
4 INJECTION INTRAMUSCULAR; INTRAVENOUS ONCE AS NEEDED
Status: DISCONTINUED | OUTPATIENT
Start: 2024-10-25 | End: 2024-10-25 | Stop reason: HOSPADM

## 2024-10-25 RX ORDER — ONDANSETRON 2 MG/ML
4 INJECTION INTRAMUSCULAR; INTRAVENOUS EVERY 6 HOURS PRN
Status: DISCONTINUED | OUTPATIENT
Start: 2024-10-25 | End: 2024-10-26 | Stop reason: HOSPADM

## 2024-10-25 RX ORDER — CHLORHEXIDINE GLUCONATE ORAL RINSE 1.2 MG/ML
15 SOLUTION DENTAL ONCE
Status: COMPLETED | OUTPATIENT
Start: 2024-10-25 | End: 2024-10-25

## 2024-10-25 RX ORDER — CEFAZOLIN SODIUM 2 G/50ML
2000 SOLUTION INTRAVENOUS ONCE
Status: COMPLETED | OUTPATIENT
Start: 2024-10-25 | End: 2024-10-25

## 2024-10-25 RX ORDER — MAGNESIUM HYDROXIDE 1200 MG/15ML
LIQUID ORAL AS NEEDED
Status: DISCONTINUED | OUTPATIENT
Start: 2024-10-25 | End: 2024-10-25 | Stop reason: HOSPADM

## 2024-10-25 RX ORDER — FENTANYL CITRATE/PF 50 MCG/ML
25 SYRINGE (ML) INJECTION
Status: DISCONTINUED | OUTPATIENT
Start: 2024-10-25 | End: 2024-10-25 | Stop reason: HOSPADM

## 2024-10-25 RX ORDER — ONDANSETRON 2 MG/ML
INJECTION INTRAMUSCULAR; INTRAVENOUS AS NEEDED
Status: DISCONTINUED | OUTPATIENT
Start: 2024-10-25 | End: 2024-10-25

## 2024-10-25 RX ORDER — SODIUM CHLORIDE, SODIUM LACTATE, POTASSIUM CHLORIDE, CALCIUM CHLORIDE 600; 310; 30; 20 MG/100ML; MG/100ML; MG/100ML; MG/100ML
INJECTION, SOLUTION INTRAVENOUS CONTINUOUS PRN
Status: DISCONTINUED | OUTPATIENT
Start: 2024-10-25 | End: 2024-10-25

## 2024-10-25 RX ORDER — HEPARIN SODIUM 5000 [USP'U]/ML
5000 INJECTION, SOLUTION INTRAVENOUS; SUBCUTANEOUS EVERY 8 HOURS SCHEDULED
Status: DISCONTINUED | OUTPATIENT
Start: 2024-10-25 | End: 2024-10-26 | Stop reason: HOSPADM

## 2024-10-25 RX ORDER — FOLIC ACID 1 MG/1
1 TABLET ORAL DAILY
Status: DISCONTINUED | OUTPATIENT
Start: 2024-10-25 | End: 2024-10-26 | Stop reason: HOSPADM

## 2024-10-25 RX ORDER — CHOLESTYRAMINE LIGHT 4 G/5.7G
4 POWDER, FOR SUSPENSION ORAL 2 TIMES DAILY
Status: DISCONTINUED | OUTPATIENT
Start: 2024-10-25 | End: 2024-10-26 | Stop reason: HOSPADM

## 2024-10-25 RX ADMIN — SEVELAMER HYDROCHLORIDE 2400 MG: 800 TABLET, FILM COATED PARENTERAL at 17:24

## 2024-10-25 RX ADMIN — HEPARIN SODIUM 5000 UNITS: 5000 INJECTION INTRAVENOUS; SUBCUTANEOUS at 17:03

## 2024-10-25 RX ADMIN — PROPOFOL 50 MCG/KG/MIN: 10 INJECTION, EMULSION INTRAVENOUS at 14:15

## 2024-10-25 RX ADMIN — ONDANSETRON 4 MG: 2 INJECTION INTRAMUSCULAR; INTRAVENOUS at 14:49

## 2024-10-25 RX ADMIN — AMIODARONE HYDROCHLORIDE 200 MG: 200 TABLET ORAL at 17:03

## 2024-10-25 RX ADMIN — HEPARIN SODIUM 5000 UNITS: 5000 INJECTION INTRAVENOUS; SUBCUTANEOUS at 22:16

## 2024-10-25 RX ADMIN — CEFAZOLIN SODIUM 2000 MG: 2 SOLUTION INTRAVENOUS at 14:08

## 2024-10-25 RX ADMIN — SODIUM CHLORIDE, SODIUM LACTATE, POTASSIUM CHLORIDE, AND CALCIUM CHLORIDE: .6; .31; .03; .02 INJECTION, SOLUTION INTRAVENOUS at 14:03

## 2024-10-25 RX ADMIN — ACETAMINOPHEN 975 MG: 325 TABLET, FILM COATED ORAL at 17:03

## 2024-10-25 RX ADMIN — FENTANYL CITRATE 50 MCG: 50 INJECTION INTRAMUSCULAR; INTRAVENOUS at 14:11

## 2024-10-25 RX ADMIN — FENTANYL CITRATE 50 MCG: 50 INJECTION INTRAMUSCULAR; INTRAVENOUS at 14:03

## 2024-10-25 RX ADMIN — ACETAMINOPHEN 1000 MG: 10 INJECTION INTRAVENOUS at 04:33

## 2024-10-25 RX ADMIN — CHOLESTYRAMINE 4 G: 4 POWDER, FOR SUSPENSION ORAL at 17:04

## 2024-10-25 RX ADMIN — IOHEXOL 100 ML: 350 INJECTION, SOLUTION INTRAVENOUS at 05:21

## 2024-10-25 RX ADMIN — FOLIC ACID 1 MG: 1 TABLET ORAL at 17:06

## 2024-10-25 RX ADMIN — CHLORHEXIDINE GLUCONATE 15 ML: 1.2 RINSE ORAL at 17:03

## 2024-10-25 RX ADMIN — FAMOTIDINE 20 MG: 20 TABLET, FILM COATED ORAL at 17:03

## 2024-10-25 NOTE — ASSESSMENT & PLAN NOTE
Patient is a 74-year-old male, non-smoker, with PMH type II DM, obesity, paroxysmal atrial fibrillation (on Eliquis), PE, and ESRD on HD. Patient presented to the [facility] emergency department today with reports of right upper extremity pain and bleeding s/p right brachio-basilic fistula creation 10/23/2024. Vascular surgery is consults due to concerns for right upper extremity ischemia.    Imaging:  CTA right upper extremity 10/25/2024:  Status post right upper extremity brachiobasilic arteriovenous fistula. The actual anastomosis is obscured by beam hardening artifact, but no significant stenosis identified elsewhere. No hematoma or evidence for active bleeding.   Right upper extremity arterial duplex 10/25/2024:  Patent Brachio-Basillic dialysis AVF. Monophasic dampened waveforms at the wrist noted in the radial and ulnar arteries.  No evidence of inflow or outflow obstruction. No evidence of a pseudoaneurysm.  No evidence of a large venous branch.    Plan:  -Acute onset right upper extremity pain and decreased motor function to right hand following AVF creation  -Suspect steal syndrome from newly created AVF  -Plan for AVF ligation this afternoon  -Last dose Eliquis was yesterday, continue holding at this time  -D/w Dr. Lamar

## 2024-10-25 NOTE — ASSESSMENT & PLAN NOTE
On Questran twice daily  Continue same for now, had previous issues with dehydration when discontinue

## 2024-10-25 NOTE — QUICK NOTE
Post Op Check:    74 y.o. male Day of Surgery s/p R AVF ligation  Patients pain is well controlled. They denied any nausea, chest pain, or shortness of breath.     Temp:  [96.8 °F (36 °C)-97.2 °F (36.2 °C)] 97.2 °F (36.2 °C)  HR:  [64-98] 76  BP: (118-150)/(63-91) 137/80  Resp:  [15-20] 15  SpO2:  [94 %-99 %] 99 %  O2 Device: Nasal cannula      Physical Exam:  General: No acute distress, alert and oriented  CV: Well perfused, regular rate  Lungs: Normal work of breathing, no increased respiratory effort  Abdomen: Soft, non-tender  Extremities: Minimal RUE edema, dressing in place with no strike through, no palpable hematoma, motor-sensory intact in right hand  Skin: Warm, dry, ecchymosis over right arm      Plan:  Diet Renal; Renal [place]; Yes; Fluid Restriction 2000 ML; No  Continue to monitor  Pain and nausea control PRN    Stephen Varma MD  General Surgery   10/25/24

## 2024-10-25 NOTE — ASSESSMENT & PLAN NOTE
"Lab Results   Component Value Date    HGBA1C 4.5 08/23/2024       No results for input(s): \"POCGLU\" in the last 72 hours.    Blood Sugar Average: Last 72 hrs:  Reported history of diabetes although now no longer on medication  Last A1c 4.5    "

## 2024-10-25 NOTE — ED PROVIDER NOTES
Time reflects when diagnosis was documented in both MDM as applicable and the Disposition within this note       Time User Action Codes Description Comment    10/25/2024  4:50 AM Morgan García Add [T82.848A] Pain from A/V fistula (HCC)           ED Disposition       None          Assessment & Plan       Medical Decision Making  Patient with history as below presented with a AV fistula problem. History obtained from patient.    Differential diagnosis includes: Bleeding AV fistula, hematoma, ischemic neuropathy related to AV fistula, fistula clot, pseudoaneurysm    Plan: CBC, CMP, PT/INR, PTT, type and screen, CTA right upper extremity, vascular consult    Labs reviewed and unremarkable.  Imaging pending.  Patient evaluated by vascular resident, awaiting final recommendations.  Signed out to next shift pending disposition.    Amount and/or Complexity of Data Reviewed  Labs: ordered. Decision-making details documented in ED Course.  Radiology: ordered.    Risk  Prescription drug management.        ED Course as of 10/25/24 0708   Fri Oct 25, 2024   0417 Hemoglobin(!): 11.1  Baseline   0421 Discussed with vascular surgery.  Surgical resident to evaluate patient.       Medications   acetaminophen (Ofirmev) injection 1,000 mg (0 mg Intravenous Stopped 10/25/24 0455)   iohexol (OMNIPAQUE) 350 MG/ML injection (MULTI-DOSE) 100 mL (100 mL Intravenous Given 10/25/24 0521)       ED Risk Strat Scores                           SBIRT 22yo+      Flowsheet Row Most Recent Value   Initial Alcohol Screen: US AUDIT-C     1. How often do you have a drink containing alcohol? 0 Filed at: 10/25/2024 0354   2. How many drinks containing alcohol do you have on a typical day you are drinking?  0 Filed at: 10/25/2024 0354   3a. Male UNDER 65: How often do you have five or more drinks on one occasion? 0 Filed at: 10/25/2024 0354   3b. FEMALE Any Age, or MALE 65+: How often do you have 4 or more drinks on one occassion? 0 Filed at: 10/25/2024  0354   Audit-C Score 0 Filed at: 10/25/2024 0354   JAD: How many times in the past year have you...    Used an illegal drug or used a prescription medication for non-medical reasons? Never Filed at: 10/25/2024 0354                            History of Present Illness       Chief Complaint   Patient presents with    Medical Problem     Patient arrived via ems from home with complaint of right arm pain and bleeding that started tonight. Patient states he had a surgery on the right arm on Wednesday. Patient states she does take thinners.        Past Medical History:   Diagnosis Date    A-fib (HCC)     Chronic kidney disease     Pulmonary embolism (HCC)       Past Surgical History:   Procedure Laterality Date    CHOLECYSTECTOMY      2022    COLONOSCOPY      [place] LUMBAR PUNCTURE DIAGNOSTIC  03/19/2021    [place] ARTERIOVENOUS ANASTOMOSIS OPEN DIRECT Right 10/23/2024    Procedure: CREATION FISTULA ARTERIOVENOUS (AV);  Surgeon: Olivia Ball DO;  Location: [facility];  Service: Vascular      No family history on file.   Social History     Tobacco Use    Smoking status: Never    Smokeless tobacco: Never   Vaping Use    Vaping status: Never Used   Substance Use Topics    Alcohol use: Not Currently    Drug use: Never      E-Cigarette/Vaping    E-Cigarette Use Never User       E-Cigarette/Vaping Substances    Nicotine No     THC No     CBD No     Flavoring No     Other No     Unknown No       I have reviewed and agree with the history as documented.     Patient is a 74-year-old male with a significant past medical history of end-stage renal disease, on dialysis, with recent creation of AV fistula of the right upper arm on 10/23/2024, presenting for evaluation of an issue with his AV fistula.  Patient reports that he has been having some bleeding from the surgical site over the last day that has soaked through the pad that is covering the fistula site.  He has been hesitant to remove the pad.  He also notes some increasing pain  in his right arm and into his right hand.  He has had some numbness in the hand as well as diminished sensation and cold fingertips.  He feels as if he is having difficulty moving the right hand compared to his norm.        Review of Systems   Skin:  Positive for wound.   Neurological:  Positive for weakness and numbness.           Objective       ED Triage Vitals   Temperature Pulse Blood Pressure Respirations SpO2 Patient Position - Orthostatic VS   10/25/24 0430 10/25/24 0353 10/25/24 0353 10/25/24 0353 10/25/24 0353 --   (!) 96.8 °F (36 °C) 93 148/80 20 94 %       Temp Source Heart Rate Source BP Location FiO2 (%) Pain Score    10/25/24 0430 -- -- -- --    Rectal          Vitals      Date and Time Temp Pulse SpO2 Resp BP Pain Score FACES Pain Rating User   10/25/24 0557 -- 78 95 % 20 149/73 -- -- ZC   10/25/24 0430 96.8 °F (36 °C) -- -- -- -- -- -- ZC   10/25/24 0353 -- 93 94 % 20 148/80 -- -- ZC            Physical Exam  Vitals and nursing note reviewed.   Constitutional:       General: He is not in acute distress.     Appearance: Normal appearance. He is obese. He is not ill-appearing or toxic-appearing.   HENT:      Head: Normocephalic and atraumatic.      Right Ear: External ear normal.      Left Ear: External ear normal.      Nose: Nose normal.   Eyes:      General: No scleral icterus.        Right eye: No discharge.         Left eye: No discharge.      Extraocular Movements: Extraocular movements intact.      Conjunctiva/sclera: Conjunctivae normal.   Cardiovascular:      Rate and Rhythm: Normal rate.      Heart sounds: Normal heart sounds. No murmur heard.     No friction rub. No gallop.   Pulmonary:      Effort: Pulmonary effort is normal. No respiratory distress.      Breath sounds: Normal breath sounds.   Chest:      Comments: IV catheter site of right-sided chest wall clean, dry, intact.  Abdominal:      General: Abdomen is flat. There is no distension.      Palpations: Abdomen is soft. There is no  mass.      Tenderness: There is no abdominal tenderness.   Genitourinary:     Comments: Deferred  Musculoskeletal:      Comments: AV fistula in the right upper extremity.  Palpable thrill.  There is bruising over the area.  There is a bloodsoaked bandage but no active bleeding from the site itself.  Staples clean, dry, intact with surrounding ecchymosis.  Fingertips appear cold with normal color.  Faint radial pulses with easily dopplerable pulse.  Full range of motion with slight hesitancy and diminished  strength.   Skin:     General: Skin is warm and dry.   Neurological:      General: No focal deficit present.      Mental Status: He is alert.               Results Reviewed       Procedure Component Value Units Date/Time    Comprehensive metabolic panel [[id number]]  (Abnormal) Collected: 10/25/24 0408    Lab Status: Final result Specimen: Blood from Arm, Left Updated: 10/25/24 0432     Sodium 138 mmol/L      Potassium 3.5 mmol/L      Chloride 101 mmol/L      CO2 24 mmol/L      ANION GAP 13 mmol/L      BUN 40 mg/dL      Creatinine 7.11 mg/dL      Glucose 105 mg/dL      Calcium 9.2 mg/dL      Corrected Calcium 9.9 mg/dL      AST 13 U/L      ALT <3 U/L      Alkaline Phosphatase 111 U/L      Total Protein 6.0 g/dL      Albumin 3.1 g/dL      Total Bilirubin 0.31 mg/dL      eGFR 6 ml/min/1.73sq m     Narrative:      National Kidney Disease Foundation guidelines for Chronic Kidney Disease (CKD):     Stage 1 with normal or high GFR (GFR > 90 mL/min/1.73 square meters)    Stage 2 Mild CKD (GFR = 60-89 mL/min/1.73 square meters)    Stage 3A Moderate CKD (GFR = 45-59 mL/min/1.73 square meters)    Stage 3B Moderate CKD (GFR = 30-44 mL/min/1.73 square meters)    Stage 4 Severe CKD (GFR = 15-29 mL/min/1.73 square meters)    Stage 5 End Stage CKD (GFR <15 mL/min/1.73 square meters)  Note: GFR calculation is accurate only with a steady state creatinine    Protime-INR [[id number]]  (Abnormal) Collected: 10/25/24 0408    Lab  Status: Final result Specimen: Blood from Arm, Left Updated: 10/25/24 0427     Protime 18.5 seconds      INR 1.54    Narrative:      INR Therapeutic Range    Indication                                             INR Range      Atrial Fibrillation                                               2.0-3.0  Hypercoagulable State                                    2.0.2.3  Left Ventricular Asist Device                            2.0-3.0  Mechanical Heart Valve                                  -    Aortic(with afib, MI, embolism, HF, LA enlargement,    and/or coagulopathy)                                     2.0-3.0 (2.5-3.5)     Mitral                                                             2.5-3.5  Prosthetic/Bioprosthetic Heart Valve               2.0-3.0  Venous thromboembolism (VTE: VT, PE        2.0-3.0    APTT [[id number]]  (Abnormal) Collected: 10/25/24 0408    Lab Status: Final result Specimen: Blood from Arm, Left Updated: 10/25/24 0427     PTT 36 seconds     CBC and differential [[id number]]  (Abnormal) Collected: 10/25/24 0408    Lab Status: Final result Specimen: Blood from Arm, Left Updated: 10/25/24 0417     WBC 6.05 Thousand/uL      RBC 3.80 Million/uL      Hemoglobin 11.1 g/dL      Hematocrit 36.0 %      MCV 95 fL      MCH 29.2 pg      MCHC 30.8 g/dL      RDW 17.8 %      MPV 9.8 fL      Platelets 200 Thousands/uL      nRBC 0 /100 WBCs      Segmented % 79 %      Immature Grans % 0 %      Lymphocytes % 12 %      Monocytes % 8 %      Eosinophils Relative 1 %      Basophils Relative 0 %      Absolute Neutrophils 4.73 Thousands/µL      Absolute Immature Grans 0.01 Thousand/uL      Absolute Lymphocytes 0.71 Thousands/µL      Absolute Monocytes 0.51 Thousand/µL      Eosinophils Absolute 0.08 Thousand/µL      Basophils Absolute 0.01 Thousands/µL             CTA upper extremity right w wo contrast    (Results Pending)   VAS DIALYSIS ACCESS EVALUATION - RIGHT AVF (Upper)    (Results Pending)       Procedures    ED  Medication and Procedure Management   Prior to Admission Medications   Prescriptions Last Dose Informant Patient Reported? Taking?   Acetaminophen 500 MG  Spouse/Significant Other Yes Yes   Sig: Take 500 mg by mouth   B Complex-C-Folic Acid (Lissa-Geno) TABS   Yes Yes   Cholecalciferol (Vitamin D3) 50 MCG (2000 UT) capsule  Spouse/Significant Other Yes Yes   Sig: Take 2,000 Units by mouth   Eliquis 5 MG  Spouse/Significant Other Yes Yes   Sucroferric Oxyhydroxide (Velphoro) 500 MG CHEW  Spouse/Significant Other No Yes   Sig: Chew 1 tablet (500 mg total) 3 (three) times a day with meals   acetaminophen (TYLENOL) 650 mg CR tablet   No Yes   Sig: Take 1 tablet (650 mg total) by mouth every 8 (eight) hours as needed for mild pain   amiodarone 200 mg tablet   No Yes   Sig: Take 1 tablet (200 mg total) by mouth daily   cholestyramine light 4 g POWD  Spouse/Significant Other Yes Yes   Sig: Take 4 g by mouth 2 (two) times a day   famotidine (PEPCID) 20 mg tablet  Spouse/Significant Other Yes Yes   Sig: Take 20 mg by mouth 2 (two) times a day   folic acid (FOLVITE) 1 mg tablet  Spouse/Significant Other Yes Yes   ondansetron (ZOFRAN-ODT) 8 mg disintegrating tablet  Spouse/Significant Other Yes No   Sig: Apply 8 mg to cheek every 8 (eight) hours as needed   sevelamer carbonate (RENVELA) 800 mg tablet  Spouse/Significant Other No Yes   Sig: Take 3 tablets (2,400 mg total) by mouth 3 (three) times a day with meals   traMADol (Ultram) 50 mg tablet   No Yes   Sig: Take 1 tablet (50 mg total) by mouth every 8 (eight) hours as needed for moderate pain for up to 10 days      Facility-Administered Medications: None     Patient's Medications   Discharge Prescriptions    No medications on file     No discharge procedures on file.  ED SEPSIS DOCUMENTATION   Time reflects when diagnosis was documented in both MDM as applicable and the Disposition within this note       Time User Action Codes Description Comment    10/25/2024  4:50 AM  Morgan García Add [T82.848A] Pain from A/V fistula ([facility])                  Morgan García, DO  10/25/24 0708

## 2024-10-25 NOTE — ASSESSMENT & PLAN NOTE
History of endocarditis with completion of antibiotic therapy 9/15/2024  Received course of cefazolin for methicillin sensitive Staph aureus

## 2024-10-25 NOTE — ASSESSMENT & PLAN NOTE
Lab Results   Component Value Date    EGFR 5 10/26/2024    EGFR 6 10/25/2024    EGFR 11 10/19/2024    CREATININE 8.79 (H) 10/26/2024    CREATININE 7.11 (H) 10/25/2024    CREATININE 4.51 (H) 10/19/2024   Reports secondary to acute pancreatitis resulting in KITTY and now ESRD  Follows with [facility]'s nephrology and receives dialysis at [facility] Monday Wednesday Friday schedule-had extra dialysis session on Saturday 10/26 as he missed previous session  Continue Renvela

## 2024-10-25 NOTE — H&P
"H&P - Hospitalist   Name: Alexandro Jefferson 74 y.o. male I MRN: [id number]  Unit/Bed#: ED-08 I Date of Admission: 10/25/2024   Date of Service: 10/25/2024 I Hospital Day: 0         Assessment and Plan  * AVF (arteriovenous fistula) ([facility])  Assessment & Plan  Presents with 1 day history of right arm arm pain with concern for possible limb ischemia  Recent fistula creation of the right arm 10/23/2024  Vascular surgery to evaluate with plans for possible ligation  Hold Eliquis, last dose yesterday, resume when cleared by vascular surgery  Patient does have alternative vascular access including permacath, previous left-sided fistula creation  CT scan:  Status post right upper extremity brachiobasilic arteriovenous fistula. The actual anastomosis is obscured by beam hardening artifact, but no significant stenosis identified elsewhere. No hematoma or evidence for active bleedin     Peptic ulcer disease  Assessment & Plan  Continue famotidine    Diabetes ([facility])  Assessment & Plan  Lab Results   Component Value Date    HGBA1C 4.5 08/23/2024       No results for input(s): \"POCGLU\" in the last 72 hours.    Blood Sugar Average: Last 72 hrs:  Reported history of diabetes although now no longer on medication  Last A1c 4.5      Chronic diarrhea  Assessment & Plan  On Questran twice daily  Continue same for now, had previous issues with dehydration when discontinue    Paroxysmal atrial fibrillation ([facility])  Assessment & Plan  Ventricle rate controlled  Dosage of amiodarone recently increased to 200 mg by cardiology  Follows with [facility]'s cardiologist Dr. Filippo Nogueira on hold due to need for operative management    History of endocarditis  Assessment & Plan  History of endocarditis with completion of antibiotic therapy 9/15/2024  Received course of cefazolin for methicillin sensitive Staph aureus    BMI 40.0-44.9, adult ([facility])  Assessment & Plan  Recommend outpatient weight loss when appropriate    ESRD (end stage renal disease) " on dialysis ([facility])  Assessment & Plan  Lab Results   Component Value Date    EGFR 6 10/25/2024    EGFR 11 10/19/2024    EGFR 9 (L) 09/14/2024    CREATININE 7.11 (H) 10/25/2024    CREATININE 4.51 (H) 10/19/2024    CREATININE 6.17 (H) 09/14/2024   Reports secondary to acute pancreatitis resulting in KITTY and now ESRD  Follows with [facility] nephrology and receives dialysis at [facility] and [place]  Continue Monday Wednesday Friday schedule-due today  Continue Renvela        Code Status: Full code    VTE Prophylaxis:  Eliquis on hold due to need for operative management   / sequential compression device     Discussion with family: Discussed with patient wife at bedside    Anticipated Length of Stay:  Patient will be admitted on an Inpatient basis with an anticipated length of stay of greater than 2 midnights.   Justification for Hospital Stay: AVF (arteriovenous fistula) ([facility])     Total Time for Visit, including Counseling / Coordination of Care: 76 minutes.  Greater than 50% of this total time spent on direct patient counseling and coordination of care.    Chief Complaint:     Chief Complaint   Patient presents with    Medical Problem     Patient arrived via ems from home with complaint of right arm pain and bleeding that started tonight. Patient states he had a surgery on the right arm on Wednesday. Patient states she does take thinners.        History of Present Illness:    Alexandro Jefferson is a 74 y.o. male who presents with left arm pain, he had recent AV fistula creation on 10/23/2024.  He reports that his hand has significant pain and he has noticed that it is much cooler.  His past medical history is notable for end-stage renal disease on hemodialysis, atrial fibrillation on amiodarone and Eliquis.  He reports some ecchymosis and bruising around the site but no other bleeding.  He denies any chest pain shortness of breath or difficulty breathing.  He does have permacath placement for hemodialysis that he has  been using.  Reports his last dose of Eliquis was yesterday and he previously held this due to need for fistula creation.    Review of Systems:    A complete and comprehensive 14 point organ system review was performed and all other systems are negative other than stated above in the HPI    Past Medical and Surgical History:     Past Medical History:   Diagnosis Date    A-fib (HCC)     Chronic kidney disease     Pulmonary embolism (HCC)        Past Surgical History:   Procedure Laterality Date    CHOLECYSTECTOMY      2022    COLONOSCOPY      [place] LUMBAR PUNCTURE DIAGNOSTIC  03/19/2021    IN ARTERIOVENOUS ANASTOMOSIS OPEN DIRECT Right 10/23/2024    Procedure: CREATION FISTULA ARTERIOVENOUS (AV);  Surgeon: Olivia Ball DO;  Location: [facility];  Service: Vascular       Meds/Allergies:    Prior to Admission medications    Medication Sig Start Date End Date Taking? Authorizing Provider   acetaminophen (TYLENOL) 650 mg CR tablet Take 1 tablet (650 mg total) by mouth every 8 (eight) hours as needed for mild pain 10/23/24  Yes Olivia Ball DO   Acetaminophen 500 MG Take 500 mg by mouth 9/13/24  Yes Historical Provider, MD   amiodarone 200 mg tablet Take 1 tablet (200 mg total) by mouth daily 10/8/24  Yes MD JONATHAN Mendoza Complex-C-Folic Acid (Lissa-Geno) TABS    Yes Historical Provider, MD   Cholecalciferol (Vitamin D3) 50 MCG (2000 UT) capsule Take 2,000 Units by mouth   Yes Historical Provider, MD   cholestyramine light 4 g POWD Take 4 g by mouth 2 (two) times a day 9/13/24  Yes Historical Provider, MD   Eliquis 5 MG    Yes Historical Provider, MD   famotidine (PEPCID) 20 mg tablet Take 20 mg by mouth 2 (two) times a day 8/4/24  Yes Historical Provider, MD   folic acid (FOLVITE) 1 mg tablet  8/4/24  Yes Historical Provider, MD   sevelamer carbonate (RENVELA) 800 mg tablet Take 3 tablets (2,400 mg total) by mouth 3 (three) times a day with meals 8/14/24  Yes Loreta Johnson PA-C   Sucroferric Oxyhydroxide  "(Velphoro) 500 MG CHEW Chew 1 tablet (500 mg total) 3 (three) times a day with meals 3/29/24  Yes Lewis Denis MD   traMADol (Ultram) 50 mg tablet Take 1 tablet (50 mg total) by mouth every 8 (eight) hours as needed for moderate pain for up to 10 days 10/23/24 11/2/24 Yes Calogero Quinn, DO   ondansetron (ZOFRAN-ODT) 8 mg disintegrating tablet Apply 8 mg to cheek every 8 (eight) hours as needed 7/18/24 10/16/24  Historical Provider, MD     I have reviewed home medications with patient personally.    Allergies:   Allergies   Allergen Reactions    Metformin GI Intolerance and Nausea Only    Cinacalcet Diarrhea and GI Intolerance    Gabapentin GI Intolerance    Oxycodone Confusion     \"Feel spacey\"    Scopolamine Confusion, Dizziness and Hallucinations     Transdermal patch   Incoherent speech    Shellfish Allergy - Food Allergy Other (See Comments)     gout       Social History:     Marital Status: /Civil Union   Occupation: Retired    Substance Use History:   Social History     Substance and Sexual Activity   Alcohol Use Not Currently     Social History     Tobacco Use   Smoking Status Never   Smokeless Tobacco Never     Social History     Substance and Sexual Activity   Drug Use Never       Family History:    No family history on file.    Physical Exam:     Vitals:   Blood Pressure: 150/91 (10/25/24 1109)  Pulse: 80 (10/25/24 1109)  Temperature: (!) 96.8 °F (36 °C) (10/25/24 0430)  Temp Source: Rectal (10/25/24 0430)  Respirations: 20 (10/25/24 1109)  Weight - Scale: 99.6 kg (219 lb 9.3 oz) (10/25/24 0353)  SpO2: 97 % (10/25/24 1109)      General: well appearing, no acute distress  HEENT: atraumatic, PERRLA, moist mucosa, normal pharynx, normal tonsils and adenoids, normal tongue, no fluid in sinuses  Neck: Trachea midline, no carotid bruit, no masses  Respiratory: normal chest wall expansion, CTA B, no r/r/w, no rubs  Cardiovascular: RRR, no m/r/g, Normal S1 and S2  Abdomen: Soft, non-tender, " non-distended, normal bowel sounds in all quadrants, no hepatosplenomegaly, no tympany  Rectal: deferred  Musculoskeletal: normal ROM in upper and lower extremities  Integumentary: warm, dry, and pink, with no rash, purpura, or petechia  Heme/Lymph: no lymphadenopathy, no bruises  Neurological: Cranial Nerves II-XII grossly intact  Psychiatric: cooperative with normal mood, affect, and cognition    Additional Data:     Lab Results: Laboratory studies reviewed for today    Results from last 7 days   Lab Units 10/25/24  0408   WBC Thousand/uL 6.05   HEMOGLOBIN g/dL 11.1*   HEMATOCRIT % 36.0*   PLATELETS Thousands/uL 200   SEGS PCT % 79*   LYMPHO PCT % 12*   MONO PCT % 8   EOS PCT % 1     Results from last 7 days   Lab Units 10/25/24  0408   SODIUM mmol/L 138   POTASSIUM mmol/L 3.5   CHLORIDE mmol/L 101   CO2 mmol/L 24   BUN mg/dL 40*   CREATININE mg/dL 7.11*   ANION GAP mmol/L 13   CALCIUM mg/dL 9.2   ALBUMIN g/dL 3.1*   TOTAL BILIRUBIN mg/dL 0.31   ALK PHOS U/L 111*   ALT U/L <3*   AST U/L 13   GLUCOSE RANDOM mg/dL 105     Results from last 7 days   Lab Units 10/25/24  0408   INR  1.54*                       Imaging: Results Review Statement: I personally reviewed the following image studies/reports in PACS and discussed pertinent findings with Radiology: CT upper extremity. My interpretation of the radiology images/reports is: Evidence of previous AV fistula creation without evidence of hematoma or active bleeding.    CTA upper extremity right w wo contrast   Final Result by Brendan Balderas MD (10/25 1048)      Status post right upper extremity brachiobasilic arteriovenous fistula. The actual anastomosis is obscured by beam hardening artifact, but no significant stenosis identified elsewhere. No hematoma or evidence for active bleeding. Ultrasound may be    obtained as warranted for further work-up.      Partially imaged inflammatory changes surrounding the proximal duodenum, correlate clinically for duodenitis or  peptic ulcer disease.                     Workstation performed: [id number]         [facility] DIALYSIS ACCESS EVALUATION - RIGHT AVF (Upper)    (Results Pending)       EKG, Pathology, and Other Studies Reviewed on Admission:   Reviewed vascular surgical operative note, case discussed with nephrology    [employer]scripts / [facility] Records Reviewed: Yes    ** Please Note: This note was completed in part utilizing [employer] Dragon [employer] One Software.  Grammatical errors, random word insertions, spelling mistakes, and incomplete sentences may be an occasional consequence of this system secondary to software limitations, ambient noise, and hardware issues.  If you have any questions or concerns about the content, text, or information contained within the body of this dictation, please contact the provider for clarification.**

## 2024-10-25 NOTE — ASSESSMENT & PLAN NOTE
Ventricle rate controlled  Dosage of amiodarone recently increased to 200 mg by cardiology  Follows with [facility] cardiologist Dr. Filippo Nogueira on hold due to need for operative management

## 2024-10-25 NOTE — PLAN OF CARE

## 2024-10-25 NOTE — CONSULTS
Consultation - Nephrology   Alexandro Jefferson 74 y.o. male MRN: [id number]  Unit/Bed#: ED-08 Encounter: [id number]    ASSESSMENT AND PLAN:  Patient is 74-year-old male with significant medical issues of ESRD on HD, recently had right upper extremity AV fistula now presents with right hand numbness, pain, AV fistula bleeding.  We are consulted for dialysis management.    ESRD on HD on MWF schedule at [facility]  -He had last dialysis treatment on Tuesday, 10/22/2024 due to planned AV access placement on Wednesday.  -Could not get dialysis today due to coming to the hospital.  -Noted patient is scheduled for OR this afternoon.  No emergent indication for dialysis today.  Will plan for HD tomorrow and then again on Monday to keep him on MWF schedule.  -Outpatient dry weight 97.5 kg.    CKD anemia  -Hemoglobin overall stable at goal.  -Remains on Mircera 200 mcg every 2 weeks as outpatient  -If hemoglobin dropping, consider starting Epogen while inpatient.  -Transfuse as needed for hemoglobin less than 7    CKD BMD, secondary hyperparathyroidism  -Remains on Calcitrol 1.25 mcg 3 times a week, Sensipar 30 mg 3 times a week, Renvela 3 tablets p.o. 3 times daily with meals as outpatient.  -Restart calcitriol.  Start phosphorus binders once tolerating diet.  Currently remains n.p.o. in anticipation of surgery this afternoon.    Hypertension, monitor blood pressure with UF removal.  -Receives midodrine as needed with dialysis for intradialytic hypotension issues.    Access, currently has permacath being used for dialysis.  -Status post right upper extremity AV fistula created on 10/23/2024.  Now presents with right hand numbness, cold, pain.  Evaluated by vascular surgery with plan noted for tentative AV fistula ligation this afternoon.    Discussed above plan in detail with primary team regarding plan for dialysis tomorrow and they agree with above recommendations.      HISTORY OF PRESENT ILLNESS:  Requesting  "Physician: Marshall Carrasquillo DO  Reason for Consult: ESRD on HD    Alexandro Jefferson is a 74 y.o. year old male who was admitted to [facility] after presenting with right hand pain, cold, numbness, AV fistula bleeding. A renal consultation is requested today for assistance in the management of dialysis.  Old medical records including prior levels were reviewed from [facility] records.  Outpatient dialysis unit records were reviewed.  Patient had his last dialysis on Tuesday due to scheduled AV fistula creation on Wednesday.  Patient had AV fistula created on 10/23/2024 and now presents with AV fistula bleeding, right hand cold, pain, numbness.  Was evaluated by vascular surgery and tentatively being scheduled for AV fistula ligation this afternoon.    Denies any worsening shortness of breath.  Denies any nausea or vomiting.    PAST MEDICAL HISTORY:  Past Medical History:   Diagnosis Date    A-fib (HCC)     Chronic kidney disease     Pulmonary embolism (HCC)        PAST SURGICAL HISTORY:  Past Surgical History:   Procedure Laterality Date    CHOLECYSTECTOMY      2022    COLONOSCOPY      FL LUMBAR PUNCTURE DIAGNOSTIC  03/19/2021    [place] ARTERIOVENOUS ANASTOMOSIS OPEN DIRECT Right 10/23/2024    Procedure: CREATION FISTULA ARTERIOVENOUS (AV);  Surgeon: Olivia Ball DO;  Location: [facility];  Service: Vascular       ALLERGIES:  Allergies   Allergen Reactions    Metformin GI Intolerance and Nausea Only    Cinacalcet Diarrhea and GI Intolerance    Gabapentin GI Intolerance    Oxycodone Confusion     \"Feel spacey\"    Scopolamine Confusion, Dizziness and Hallucinations     Transdermal patch   Incoherent speech    Shellfish Allergy - Food Allergy Other (See Comments)     gout       SOCIAL HISTORY:  Social History     Substance and Sexual Activity   Alcohol Use Not Currently     Social History     Substance and Sexual Activity   Drug Use Never     Social History     Tobacco Use   Smoking Status Never   Smokeless Tobacco " Never       FAMILY HISTORY:  No family history on file.    MEDICATIONS:    Current Facility-Administered Medications:     ceFAZolin (ANCEF) IVPB (premix in dextrose) 2,000 mg 50 mL, 2,000 mg, Intravenous, Once, IJEOMA Maria    chlorhexidine (PERIDEX) 0.12 % oral rinse 15 mL, 15 mL, Swish & Spit, Once, IJEOMA Maria    Current Outpatient Medications:     acetaminophen (TYLENOL) 650 mg CR tablet, Take 1 tablet (650 mg total) by mouth every 8 (eight) hours as needed for mild pain, Disp: 30 tablet, Rfl: 0    Acetaminophen 500 MG, Take 500 mg by mouth, Disp: , Rfl:     amiodarone 200 mg tablet, Take 1 tablet (200 mg total) by mouth daily, Disp: 90 tablet, Rfl: 3    B Complex-C-Folic Acid (Lissa-Geno) TABS, , Disp: , Rfl:     Cholecalciferol (Vitamin D3) 50 MCG (2000 UT) capsule, Take 2,000 Units by mouth, Disp: , Rfl:     cholestyramine light 4 g POWD, Take 4 g by mouth 2 (two) times a day, Disp: , Rfl:     Eliquis 5 MG, , Disp: , Rfl:     famotidine (PEPCID) 20 mg tablet, Take 20 mg by mouth 2 (two) times a day, Disp: , Rfl:     folic acid (FOLVITE) 1 mg tablet, , Disp: , Rfl:     sevelamer carbonate (RENVELA) 800 mg tablet, Take 3 tablets (2,400 mg total) by mouth 3 (three) times a day with meals, Disp: 810 tablet, Rfl: 3    Sucroferric Oxyhydroxide (Velphoro) 500 MG CHEW, Chew 1 tablet (500 mg total) 3 (three) times a day with meals, Disp: 270 tablet, Rfl: 3    traMADol (Ultram) 50 mg tablet, Take 1 tablet (50 mg total) by mouth every 8 (eight) hours as needed for moderate pain for up to 10 days, Disp: 30 tablet, Rfl: 0    ondansetron (ZOFRAN-ODT) 8 mg disintegrating tablet, Apply 8 mg to cheek every 8 (eight) hours as needed, Disp: , Rfl:     REVIEW OF SYSTEMS:  More than 10 point review of systems were obtained and discussed in length with the patient. Review of systems were negative / unremarkable except mentioned in the HPI section.     PHYSICAL EXAM:  Current Weight: Weight - Scale: 99.6 kg (219 lb 9.3  oz)  First Weight: Weight - Scale: 99.6 kg (219 lb 9.3 oz)  Vitals:    10/25/24 1109   BP: 150/91   Pulse: 80   Resp: 20   Temp:    SpO2: 97%       Intake/Output Summary (Last 24 hours) at 10/25/2024 1218  Last data filed at 10/25/2024 0455  Gross per 24 hour   Intake 100 ml   Output --   Net 100 ml     Wt Readings from Last 3 Encounters:   10/25/24 99.6 kg (219 lb 9.3 oz)   10/23/24 96.4 kg (212 lb 8.4 oz)   10/08/24 97.3 kg (214 lb 6.4 oz)     Temp Readings from Last 3 Encounters:   10/25/24 (!) 96.8 °F (36 °C) (Rectal)   10/23/24 (!) 97.3 °F (36.3 °C) (Temporal)     BP Readings from Last 3 Encounters:   10/25/24 150/91   10/23/24 120/62   10/08/24 120/70     Pulse Readings from Last 3 Encounters:   10/25/24 80   10/23/24 91   10/08/24 (!) 114        Physical Examination:  Eyes: Mild conjunctival pallor present  ENT: External examination of ear and nose unremarkable  Neck: No obvious lymphadenopathy appreciated  Respiratory: Bilateral air entry present  CVS: No significant edema in legs  GI: Soft, nondistended  CNS: Active, alert, follows commands  Skin: No new rash  Musculoskeletal: No obvious new gross deformity noted    Invasive Devices:      Lab Results:   Results from last 7 days   Lab Units 10/25/24  0408 10/23/24  1045 10/19/24  0955   WBC Thousand/uL 6.05  --  4.33   HEMOGLOBIN g/dL 11.1*  --  10.0*   HEMATOCRIT % 36.0*  --  33.9*   PLATELETS Thousands/uL 200  --  231   POTASSIUM mmol/L 3.5 3.2* 3.3*   CHLORIDE mmol/L 101  --  99   CO2 mmol/L 24  --  31   BUN mg/dL 40*  --  19   CREATININE mg/dL 7.11*  --  4.51*   CALCIUM mg/dL 9.2  --  9.5       Other Studies:   CTA upper extremity right w wo contrast   Final Result by Brendan Balderas MD (10/25 1048)      Status post right upper extremity brachiobasilic arteriovenous fistula. The actual anastomosis is obscured by beam hardening artifact, but no significant stenosis identified elsewhere. No hematoma or evidence for active bleeding. Ultrasound may be   "  obtained as warranted for further work-up.      Partially imaged inflammatory changes surrounding the proximal duodenum, correlate clinically for duodenitis or peptic ulcer disease.                     Workstation performed: [id number]         VAS DIALYSIS ACCESS EVALUATION - RIGHT AVF (Upper)    (Results Pending)       Portions of the record may have been created with voice recognition software. Occasional wrong word or \"sound a like\" substitutions may have occurred due to the inherent limitations of voice recognition software. Read the chart carefully and recognize, using context, where substitutions have occurred.    "

## 2024-10-25 NOTE — ASSESSMENT & PLAN NOTE
Presents with 1 day history of right arm arm pain with concern for possible limb ischemia  Recent fistula creation of the right arm 10/23/2024  Vascular surgery-underwent ligation 10/26 and has good perfusion today, vascular cleared for discharge recommended to start Eliquis tomorrow  Patient does have alternative vascular access including permacath, previous left-sided fistula creation  CT scan:  Status post right upper extremity brachiobasilic arteriovenous fistula. The actual anastomosis is obscured by beam hardening artifact, but no significant stenosis identified elsewhere. No hematoma or evidence for active bleedin

## 2024-10-25 NOTE — OP NOTE
OPERATIVE REPORT  PATIENT NAME: Alexandro Jefferson    :  1950  MRN: [id number]  Pt Location: [facility] 09    SURGERY DATE: 10/25/2024    Surgeons and Role:     * Michael Lamar MD - Primary     * Tan Warner MD - Assisting     * Ramsey Portillo DO - Fellow    Preop Diagnosis:  Right upper extremity steal syndrome     Post-Op Diagnosis Codes:  Right upper extremity steal syndrome     Procedure(s):  LIGATION OF RIGHT UPPER EXTREMITY BRACHIOBASILIC AV FISTULA    Specimen(s):  None    Estimated Blood Loss:   Minimal    Drains:  None    Anesthesia Type:   Sedation and local     Operative Indications:  74-year-old male, non-smoker, with PMH type II DM, obesity, paroxysmal atrial fibrillation (on Eliquis), PE, and ESRD on HD, baseline BUE weakness/numbness of fingers, R>L Patient presented to the [facility] emergency department with reports of right upper extremity pain, coolness and bleeding s/p right brachio-basilic fistula creation 10/23/2024 (DIM) presented with cold hand with worsening motor/sensory dysfunction concerning for steal syndrome.  Given concern for steal recommendation was made for ligation of RUE brachiobasilic fistula.  Risks, benefits, alternatives, and description of procedure discussed in detail and patient amenable to proceeding.      Operative Findings:  Minimal flow within the right brachial artery distal to brachiobasilic AVF anastamosis with non-palp artery.  Significant improvement of brachial artery flow with palpable pulse distal to anastomosis with occlusion of AVF.  As such, fistula with ligate 5mm distal to inflow anastomosis with use of two 0-silk sutures.  Excellent flow within native brachial artery distally along with improved digital cap refill after ligation of fistula.        Complications:   None    Procedure and Technique:  Consent was obtained preoperatively.  The patient was brought to the operating room and placed on the table in supine position, all bony prominences  were appropriately padded.  Preoperative antibiotics were administered.  The patient received Monitored Local Anesthesia with Sedation.   The patient was prepped and draped in usual sterile surgical fashion.  A timeout was performed identifying patient by name, date of birth, and procedure with all in agreement.     Previously placed skin staples are removed with the use of a hemostatic clamp.  Metzenbaum scissors were used to cut previously placed subdermal sutures and blunt dissection was used to exposure the brachiobasilic arteriovenous fistula.  Fistula appeared dilated to approximately 1 cm with palpable thrill.  Brachial artery distal to brachiobasilic was non-palpable with minimal flow distal to arteriovenous anastomosis.  There was improvement of brachial artery flow with palpable pulse distal to anastomosis with occlusion of the fistula.  Based on this, decision was made to ligate the arteriovenous fistula to improve distal perfusion to RUE.  The basilic vein was ligated approximately 5mm distal to arteriovenous anastomosis with use of two 0-silk sutures ties. After ligation there was excellent flow within the brachial artery with a palpable pulse beyond the anastomotic site with hand being warm with improved capillary refill.  The incision site was thoroughly irrigated with sterile saline solution.  Hemostasis appropriately achieved.  Deep tissue was closed with interrupted 3-0 Monocryl sutures and skin incision closed with surgical staples.  Sterile Meplex dressing applied to incision site.    At the conclusion of the procedure all sponge and instrument counts were correct.  Patient tolerated procedure well.  Patient was transferred to PACU in stable condition.      Dr. Lamar was present for the entire procedure.    Patient Disposition:  PACU     SIGNATURE: Ramsey Portillo DO  DATE: October 25, 2024  TIME: 2:55 PM

## 2024-10-25 NOTE — ANESTHESIA PREPROCEDURE EVALUATION
Procedure:  LIGATION OF RIGHT UPPER EXTREMITY AV FISTULA (Right: Arm Upper)    Relevant Problems   CARDIO   (+) AVF (arteriovenous fistula) (HCC)   (+) Other pulmonary embolism without acute cor pulmonale, unspecified chronicity (HCC)   (+) Paroxysmal atrial fibrillation (HCC)      GI/HEPATIC   (+) Peptic ulcer disease      /RENAL   (+) ESRD (end stage renal disease) on dialysis (HCC)      Endocrine   (+) Diabetes (HCC)      Cardiovascular/Peripheral Vascular   (+) Chronic pulmonary heart disease (HCC)      Other   (+) BMI 40.0-44.9, adult (HCC)   (+) History of endocarditis   (+) On amiodarone therapy      Latest Reference Range & Units 10/25/24 04:08   Sodium 135 - 147 mmol/L 138   Potassium 3.5 - 5.3 mmol/L 3.5   Chloride 96 - 108 mmol/L 101   Carbon Dioxide 21 - 32 mmol/L 24   ANION GAP 4 - 13 mmol/L 13   BUN 5 - 25 mg/dL 40 (H)   Creatinine 0.60 - 1.30 mg/dL 7.11 (H)   GLUCOSE 65 - 140 mg/dL 105   (H): Data is abnormally high     Latest Reference Range & Units 10/25/24 04:08   WBC 4.31 - 10.16 Thousand/uL 6.05   RBC 3.88 - 5.62 Million/uL 3.80 (L)   Hemoglobin 12.0 - 17.0 g/dL 11.1 (L)   Hematocrit 36.5 - 49.3 % 36.0 (L)   MCV 82 - 98 fL 95   MCH 26.8 - 34.3 pg 29.2   MCHC 31.4 - 37.4 g/dL 30.8 (L)   RDW 11.6 - 15.1 % 17.8 (H)   Platelet Count 149 - 390 Thousands/uL 200   (L): Data is abnormally low  (H): Data is abnormally high     Latest Reference Range & Units 10/25/24 04:08   PROTIME 12.3 - 15.0 seconds 18.5 (H)   POCT INR 0.85 - 1.19  1.54 (H)   PTT 23 - 34 seconds 36 (H)   (H): Data is abnormally high    ECHO 4/2/24:    This result has an attachment that is not available.     Left Ventricle: Left ventricle is normal in size. Systolic function is   low normal with an ejection fraction of 50-55%. Limited visualization of   the LV endocardial border that does not allow for detailed assessment of   regional wall motion and limits the accuracy of calculated volumetric LVEF   despite Echo contrast use. There  is grade II (moderate) diastolic   dysfunction and elevated left atrial pressure.     Left Atrium: Left atrium cavity size is normal.     Right Ventricle: Right ventricle was not well visualized. Right   ventricle cavity is normal. Systolic function is normal.     Aortic Valve: The aortic valve was not well visualized. The leaflets   are moderately calcified. There is no regurgitation. There is moderate   stenosis. Peak velocity 2.8 m/sec, MG 13 mmHg, DI 0.35 and AMY 1.1 cm2.     Pericardium: There is no pericardial effusion.     Pulmonic Valve: PA pressure not calculated due to incomplete TR signal.     Left Ventricle   Left ventricle is normal in size. Systolic function is low normal with an ejection fraction of 50-55%. Limited visualization of the LV endocardial border that does not allow for detailed assessment of regional wall motion and limits the accuracy of calculated volumetric LVEF despite Echo contrast use. There is grade II (moderate) diastolic dysfunction and elevated left atrial pressure.     Right Ventricle   Right ventricle was not well visualized. Right ventricle cavity is normal. Systolic function is normal.     Left Atrium   Left atrium cavity size is normal.     Right Atrium   Right atrium cavity is normal.     IVC/SVC   IVC not well visualized.     Mitral Valve   Mitral valve structure is normal. There is mild regurgitation. There is no evidence of mitral valve stenosis.     Tricuspid Valve   Tricuspid valve structure is normal. There is trace regurgitation. There is no evidence of tricuspid valve stenosis.     Aortic Valve   The aortic valve was not well visualized. The leaflets are moderately calcified. There is no regurgitation. There is moderate stenosis. Peak velocity 2.8 m/sec, MG 13 mmHg, DI 0.35 and AMY 1.1 cm2.     Pulmonic Valve   The pulmonic valve was not well visualized. There is no regurgitation or stenosis. PA pressure not calculated due to incomplete TR signal.     Ascending  Aorta   The aorta appears normal in size.     Pericardium   There is no pericardial effusion.     Study Details   A complete 2D echocardiogram was performed. Color flow, Pulse Wave and Continuous Wave Doppler was performed and analyzed.Definity contrast was used during the study.      Physical Exam    Airway    Mallampati score: II  TM Distance: >3 FB  Neck ROM: full     Dental   No notable dental hx     Cardiovascular      Pulmonary      Other Findings        Anesthesia Plan  ASA Score- 4     Anesthesia Type- IV sedation with anesthesia with ASA Monitors.         Additional Monitors:     Airway Plan:            Plan Factors-Exercise tolerance (METS): <4 METS.    Chart reviewed.   Existing labs reviewed. Patient summary reviewed.    Patient is not a current smoker.  Patient did not smoke on day of surgery.            Induction- intravenous.    Postoperative Plan-     Perioperative Resuscitation Plan - Level 1 - Full Code.       Informed Consent- Anesthetic plan and risks discussed with patient.  I personally reviewed this patient with the CRNA. Discussed and agreed on the Anesthesia Plan with the CRNA..

## 2024-10-25 NOTE — ASSESSMENT & PLAN NOTE
Body mass index is 31.41 kg/m².  Recommend incorporating a more whole foods plant-predominant diet along with decreasing consumption of red meats and processed foods  Per AHA guidelines, recommend moderate-vigorous intensity exercise for 30 minutes a day for 5 days a week or a total of 150 min/week

## 2024-10-25 NOTE — ANESTHESIA POSTPROCEDURE EVALUATION
Post-Op Assessment Note    CV Status:  Stable  Pain Score: 1    Pain management: adequate       Mental Status:  Alert and awake   Hydration Status:  Euvolemic   PONV Controlled:  Controlled   Airway Patency:  Patent     Post Op Vitals Reviewed: Yes    No anethesia notable event occurred.    Staff: Anesthesiologist           Last Filed PACU Vitals:  Vitals Value Taken Time   Temp 97.2 °F (36.2 °C) 10/25/24 1553   Pulse 74 10/25/24 1555   /80 10/25/24 1553   Resp 15 10/25/24 1553   SpO2 99 % 10/25/24 1555   Vitals shown include unfiled device data.    Modified Lesly:  Activity: 2 (10/25/2024  3:53 PM)  Respiration: 2 (10/25/2024  3:53 PM)  Circulation: 2 (10/25/2024  3:53 PM)  Consciousness: 1 (10/25/2024  3:53 PM)  Oxygen Saturation: 1 (10/25/2024  3:53 PM)  Modified Lesly Score: 8 (10/25/2024  3:53 PM)

## 2024-10-25 NOTE — CONSULTS
Consultation - Vascular Surgery   Name: Alexandro Jefferson 74 y.o. male I MRN: [id number]  Unit/Bed#: OR POOL I Date of Admission: 10/25/2024   Date of Service: 10/25/2024 I Hospital Day: 0   Inpatient consult to Vascular Surgery  Consult performed by: IJEOMA Weinberg  Consult ordered by: Morgan García DO        Physician Requesting Evaluation: Marshall Carrasquillo DO   Reason for Evaluation / Principal Problem: right upper extremity pain    Assessment & Plan  AVF (arteriovenous fistula) (HCC)  Patient is a 74-year-old male, non-smoker, with PMH type II DM, obesity, paroxysmal atrial fibrillation (on Eliquis), PE, and ESRD on HD. Patient presented to the [facility] emergency department today with reports of right upper extremity pain and bleeding s/p right brachio-basilic fistula creation 10/23/2024. Vascular surgery is consults due to concerns for right upper extremity ischemia.    Imaging:  CTA right upper extremity 10/25/2024:  Status post right upper extremity brachiobasilic arteriovenous fistula. The actual anastomosis is obscured by beam hardening artifact, but no significant stenosis identified elsewhere. No hematoma or evidence for active bleeding.   Right upper extremity arterial duplex 10/25/2024:  Patent Brachio-Basillic dialysis AVF. Monophasic dampened waveforms at the wrist noted in the radial and ulnar arteries.  No evidence of inflow or outflow obstruction. No evidence of a pseudoaneurysm.  No evidence of a large venous branch.    Plan:  -Acute onset right upper extremity pain and decreased motor function to right hand following AVF creation  -Suspect steal syndrome from newly created AVF  -Plan for AVF ligation this afternoon  -Last dose Eliquis was yesterday, continue holding at this time  -D/w Dr. Lamar     I have discussed the above management plan in detail with the primary service.   Vascular Surgery service will follow.  Please contact the SecureChat role for the Vascular Surgery  service with any questions/concerns.    History of Present Illness   Alexandro Jefferson is a 74 y.o. male, non-smoker, with PMH type II DM, obesity, paroxysmal atrial fibrillation (on Eliquis), PE, and ESRD on HD. Patient presented to the [facility] emergency department today with reports of right upper extremity pain and bleeding s/p right brachio-basilic fistula creation 10/23/2024.     Patient reports that yesterday he developed pain to the right upper extremity which he believed to be normal post operative pain. Pain progressively worsened over the course of the evening leading to patient presenting to the emergency department today. He does report weakness to the right hand at his baseline and numbness, however weakness to the right hand has worsened since yesterday. He reports severe pain extending from right fingertips to right elbow. He denies any fever, chills, erythema, or warmth at surgical incision site. He currently receives HD via right chest wall permacath every M-W-F.    Review of Systems   Constitutional: Negative.    HENT: Negative.     Eyes: Negative.    Respiratory:  Negative for shortness of breath.    Cardiovascular:  Negative for chest pain.   Endocrine: Negative.    Genitourinary: Negative.    Musculoskeletal: Negative.    Skin:  Negative for color change, pallor and wound.   Allergic/Immunologic: Negative.    Neurological:  Positive for weakness and numbness.   Hematological:  Bruises/bleeds easily.   Psychiatric/Behavioral: Negative.       I have reviewed the patient's PMH, PSH, Social History, Family History, Meds, and Allergies    Objective :  Temp:  [96.8 °F (36 °C)] 96.8 °F (36 °C)  HR:  [64-98] 98  BP: (138-150)/(73-91) 142/84  Resp:  [20] 20  SpO2:  [94 %-97 %] 95 %  O2 Device: None (Room air)    I/O         10/23 0701  10/24 0700 10/24 0701  10/25 0700 10/25 0701  10/26 0700    IV Piggyback  100     Total Intake(mL/kg)  100 (1)     Net  +100                    Physical Exam  Vitals and  nursing note reviewed.   Constitutional:       General: He is not in acute distress.     Appearance: Normal appearance. He is well-developed. He is obese.   HENT:      Head: Normocephalic and atraumatic.   Eyes:      Conjunctiva/sclera: Conjunctivae normal.   Cardiovascular:      Rate and Rhythm: Normal rate. Rhythm irregular.      Heart sounds: Normal heart sounds. No murmur heard.     Comments: Faint right radial doppler signal, absent right ulnar and palmar arch signals  Pulmonary:      Effort: Pulmonary effort is normal. No respiratory distress.      Breath sounds: Normal breath sounds.   Abdominal:      General: There is no distension.      Palpations: Abdomen is soft.      Tenderness: There is no abdominal tenderness.   Musculoskeletal:         General: Tenderness (right upper extremity) present. No swelling.      Cervical back: Neck supple.   Skin:     General: Skin is warm and dry.      Capillary Refill: Capillary refill takes less than 2 seconds.      Findings: Ecchymosis (around right upper extremity incision) and wound present.             Comments: Right upper extremity cool to touch from fingertips to antecubital fossa   Neurological:      General: No focal deficit present.      Mental Status: He is alert and oriented to person, place, and time.   Psychiatric:         Mood and Affect: Mood normal.         Behavior: Behavior normal.            Lab Results: I have reviewed the following results:  Recent Labs     10/25/24  0408   WBC 6.05   HGB 11.1*   HCT 36.0*      SODIUM 138   K 3.5      CO2 24   BUN 40*   CREATININE 7.11*   GLUC 105   AST 13   ALT <3*   ALB 3.1*   TBILI 0.31   ALKPHOS 111*   PTT 36*   INR 1.54*       Imaging Results Review: I reviewed radiology reports from this admission including: CT right upper extremity and Ultrasound(s).  Other Study Results Review: No additional pertinent studies reviewed.

## 2024-10-26 ENCOUNTER — APPOINTMENT (INPATIENT)
Dept: DIALYSIS | Facility: HOSPITAL | Age: 74
DRG: 252 | End: 2024-10-26
Payer: MEDICARE

## 2024-10-26 VITALS
SYSTOLIC BLOOD PRESSURE: 133 MMHG | OXYGEN SATURATION: 97 % | TEMPERATURE: 97.5 F | BODY MASS INDEX: 31.34 KG/M2 | RESPIRATION RATE: 16 BRPM | HEART RATE: 91 BPM | DIASTOLIC BLOOD PRESSURE: 78 MMHG | WEIGHT: 218.92 LBS | HEIGHT: 70 IN

## 2024-10-26 PROBLEM — M89.9 CHRONIC KIDNEY DISEASE-MINERAL BONE DISORDER (CKD-MBD) WITH STAGE 5 CHRONIC KIDNEY DISEASE, ON CHRONIC DIALYSIS (HCC): Status: ACTIVE | Noted: 2024-10-26

## 2024-10-26 PROBLEM — E83.9 CHRONIC KIDNEY DISEASE-MINERAL BONE DISORDER (CKD-MBD) WITH STAGE 5 CHRONIC KIDNEY DISEASE, ON CHRONIC DIALYSIS (HCC): Status: ACTIVE | Noted: 2024-10-26

## 2024-10-26 PROBLEM — I10 PRIMARY HYPERTENSION: Status: ACTIVE | Noted: 2024-10-26

## 2024-10-26 PROBLEM — N18.6 ANEMIA DUE TO CHRONIC KIDNEY DISEASE, ON CHRONIC DIALYSIS (HCC): Status: ACTIVE | Noted: 2024-10-26

## 2024-10-26 PROBLEM — D63.1 ANEMIA DUE TO CHRONIC KIDNEY DISEASE, ON CHRONIC DIALYSIS (HCC): Status: ACTIVE | Noted: 2024-10-26

## 2024-10-26 PROBLEM — N18.5 CHRONIC KIDNEY DISEASE-MINERAL BONE DISORDER (CKD-MBD) WITH STAGE 5 CHRONIC KIDNEY DISEASE, ON CHRONIC DIALYSIS (HCC): Status: ACTIVE | Noted: 2024-10-26

## 2024-10-26 PROBLEM — Z99.2 CHRONIC KIDNEY DISEASE-MINERAL BONE DISORDER (CKD-MBD) WITH STAGE 5 CHRONIC KIDNEY DISEASE, ON CHRONIC DIALYSIS (HCC): Status: ACTIVE | Noted: 2024-10-26

## 2024-10-26 PROBLEM — Z99.2 ANEMIA DUE TO CHRONIC KIDNEY DISEASE, ON CHRONIC DIALYSIS (HCC): Status: ACTIVE | Noted: 2024-10-26

## 2024-10-26 LAB
ANION GAP SERPL CALCULATED.3IONS-SCNC: 13 MMOL/L (ref 4–13)
BUN SERPL-MCNC: 56 MG/DL (ref 5–25)
CALCIUM SERPL-MCNC: 8.8 MG/DL (ref 8.4–10.2)
CHLORIDE SERPL-SCNC: 101 MMOL/L (ref 96–108)
CO2 SERPL-SCNC: 24 MMOL/L (ref 21–32)
CREAT SERPL-MCNC: 8.79 MG/DL (ref 0.6–1.3)
ERYTHROCYTE [DISTWIDTH] IN BLOOD BY AUTOMATED COUNT: 17.9 % (ref 11.6–15.1)
GFR SERPL CREATININE-BSD FRML MDRD: 5 ML/MIN/1.73SQ M
GLUCOSE SERPL-MCNC: 73 MG/DL (ref 65–140)
HBV CORE AB SER QL: NORMAL
HBV CORE IGM SER QL: NORMAL
HBV SURFACE AB SER-ACNC: 3.58 MIU/ML
HBV SURFACE AG SER QL: NORMAL
HCT VFR BLD AUTO: 34.7 % (ref 36.5–49.3)
HCV AB SER QL: NORMAL
HGB BLD-MCNC: 10.7 G/DL (ref 12–17)
MCH RBC QN AUTO: 29.2 PG (ref 26.8–34.3)
MCHC RBC AUTO-ENTMCNC: 30.8 G/DL (ref 31.4–37.4)
MCV RBC AUTO: 95 FL (ref 82–98)
PLATELET # BLD AUTO: 168 THOUSANDS/UL (ref 149–390)
PMV BLD AUTO: 10.2 FL (ref 8.9–12.7)
POTASSIUM SERPL-SCNC: 3.7 MMOL/L (ref 3.5–5.3)
RBC # BLD AUTO: 3.66 MILLION/UL (ref 3.88–5.62)
SODIUM SERPL-SCNC: 138 MMOL/L (ref 135–147)
WBC # BLD AUTO: 5.98 THOUSAND/UL (ref 4.31–10.16)

## 2024-10-26 PROCEDURE — 86704 HEP B CORE ANTIBODY TOTAL: CPT | Performed by: NURSE PRACTITIONER

## 2024-10-26 PROCEDURE — 99024 POSTOP FOLLOW-UP VISIT: CPT | Performed by: SURGERY

## 2024-10-26 PROCEDURE — 85027 COMPLETE CBC AUTOMATED: CPT | Performed by: INTERNAL MEDICINE

## 2024-10-26 PROCEDURE — 86705 HEP B CORE ANTIBODY IGM: CPT | Performed by: NURSE PRACTITIONER

## 2024-10-26 PROCEDURE — 87340 HEPATITIS B SURFACE AG IA: CPT | Performed by: NURSE PRACTITIONER

## 2024-10-26 PROCEDURE — 80048 BASIC METABOLIC PNL TOTAL CA: CPT | Performed by: INTERNAL MEDICINE

## 2024-10-26 PROCEDURE — 90935 HEMODIALYSIS ONE EVALUATION: CPT | Performed by: NURSE PRACTITIONER

## 2024-10-26 PROCEDURE — 86706 HEP B SURFACE ANTIBODY: CPT | Performed by: NURSE PRACTITIONER

## 2024-10-26 PROCEDURE — 99239 HOSP IP/OBS DSCHRG MGMT >30: CPT

## 2024-10-26 PROCEDURE — 86803 HEPATITIS C AB TEST: CPT | Performed by: NURSE PRACTITIONER

## 2024-10-26 RX ORDER — MIDODRINE HYDROCHLORIDE 5 MG/1
5 TABLET ORAL DAILY PRN
Status: DISCONTINUED | OUTPATIENT
Start: 2024-10-26 | End: 2024-10-26 | Stop reason: HOSPADM

## 2024-10-26 RX ADMIN — FOLIC ACID 1 MG: 1 TABLET ORAL at 08:12

## 2024-10-26 RX ADMIN — HEPARIN SODIUM 5000 UNITS: 5000 INJECTION INTRAVENOUS; SUBCUTANEOUS at 05:58

## 2024-10-26 RX ADMIN — SEVELAMER HYDROCHLORIDE 2400 MG: 800 TABLET, FILM COATED PARENTERAL at 13:01

## 2024-10-26 RX ADMIN — AMIODARONE HYDROCHLORIDE 200 MG: 200 TABLET ORAL at 08:12

## 2024-10-26 RX ADMIN — CHOLESTYRAMINE 4 G: 4 POWDER, FOR SUSPENSION ORAL at 08:12

## 2024-10-26 RX ADMIN — SEVELAMER HYDROCHLORIDE 2400 MG: 800 TABLET, FILM COATED PARENTERAL at 07:37

## 2024-10-26 NOTE — ASSESSMENT & PLAN NOTE
Patient is a 74-year-old male, non-smoker, with PMH type II DM, obesity, paroxysmal atrial fibrillation (on Eliquis), PE, and ESRD on HD, baseline BUE weakness/numbness of fingers, R>L Patient presented to the [facility] emergency department with reports of right upper extremity pain, coolness and bleeding s/p right brachio-basilic fistula creation 10/23/2024 (DIM). Vascular surgery is consults due to concerns for right upper extremity ischemia now s/p RUE AVF ligation     Imaging:  CTA right upper extremity 10/25/2024:  Status post right upper extremity brachiobasilic arteriovenous fistula. The actual anastomosis is obscured by beam hardening artifact, but no significant stenosis identified elsewhere. No hematoma or evidence for active bleeding.   Right upper extremity arterial duplex 10/25/2024:  Patent Brachio-Basillic dialysis AVF. Monophasic dampened waveforms at the wrist noted in the radial and ulnar arteries.  No evidence of inflow or outflow obstruction. No evidence of a pseudoaneurysm.  No evidence of a large venous branch.    Plan:  -Baseline right upper extremity weakness with increased pain, coolness and decreased motor function to right hand following AVF creation 10/23 secondary to steal syndrome s/p R upper extremity AVF ligation by Dr Lamar 10/25/2024  -POD #1 s/p RUE AVF ligation. Right hand warm, faint doppler signal radial and ulnar. Patient notes right hand back to baseline. Not able to make a complete fist which he notes is back to his baseline. Dressing in place with partial strike through. No active bleeding. Attempted to remove postopertive dressing and resulted in skin peeling. Will leave in place with short interim outpatient follow up    -Leave dressing in place for 3-4 days. If soils completely can be removed, incision cleansed with soap and water, pat dry and cover with gauze and nilesh  -Moderate RUE swelling and ecchymosis. Elevate right arm on 2 pillows to help manage swelling   -Post op  appointment in 3 days for dressing removal. Sent to scheduling. Will confirm appointment with patient   -Stable from a vascular standpoint for discharge and outpatient follow up   -Outpatient follow up to discuss new access in the future   -Discussed with SLIM   -Will discuss with Dr Mindi Dolan

## 2024-10-26 NOTE — PROGRESS NOTES
Progress Note - Vascular Surgery   Name: Alexandro Jefferson 74 y.o. male I MRN: [id number]  Unit/Bed#: E5 -01 I Date of Admission: 10/25/2024   Date of Service: 10/26/2024 I Hospital Day: 1    Assessment & Plan  AVF (arteriovenous fistula) (HCC)  Patient is a 74-year-old male, non-smoker, with PMH type II DM, obesity, paroxysmal atrial fibrillation (on Eliquis), PE, and ESRD on HD, baseline BUE weakness/numbness of fingers, R>L Patient presented to the [facility] emergency department with reports of right upper extremity pain, coolness and bleeding s/p right brachio-basilic fistula creation 10/23/2024 (DIM). Vascular surgery is consults due to concerns for right upper extremity ischemia now s/p RUE AVF ligation     Imaging:  CTA right upper extremity 10/25/2024:  Status post right upper extremity brachiobasilic arteriovenous fistula. The actual anastomosis is obscured by beam hardening artifact, but no significant stenosis identified elsewhere. No hematoma or evidence for active bleeding.   Right upper extremity arterial duplex 10/25/2024:  Patent Brachio-Basillic dialysis AVF. Monophasic dampened waveforms at the wrist noted in the radial and ulnar arteries.  No evidence of inflow or outflow obstruction. No evidence of a pseudoaneurysm.  No evidence of a large venous branch.    Plan:  -Baseline right upper extremity weakness with increased pain, coolness and decreased motor function to right hand following AVF creation 10/23 secondary to steal syndrome s/p R upper extremity AVF ligation by Dr Lamar 10/25/2024  -POD #1 s/p RUE AVF ligation. Right hand warm, faint doppler signal radial and ulnar. Patient notes right hand back to baseline. Not able to make a complete fist which he notes is back to his baseline. Dressing in place with partial strike through. No active bleeding. Attempted to remove postopertive dressing and resulted in skin peeling. Will leave in place with short interim outpatient follow up    -Leave  dressing in place for 3-4 days. If soils completely can be removed, incision cleansed with soap and water, pat dry and cover with gauze and nilesh  -Moderate RUE swelling and ecchymosis. Elevate right arm on 2 pillows to help manage swelling   -Post op appointment in 3 days for dressing removal. Sent to scheduling. Will confirm appointment with patient   -Stable from a vascular standpoint for discharge and outpatient follow up   -Outpatient follow up to discuss new access in the future   -Discussed with SLIM   -Will discuss with Dr Obregon Doctor          24 Hour Events : S/p RUE AVF ligation 2/2 steal syndrome     Subjective :   POD#3 s/p R brachial basilic AV fistula creation (DIM)  POD #1 S/p RUE AVF ligation (MJQ)  Patient denies right hand pain and notes right hand is warm.  He feels that his right hand is back to baseline.  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  He is able to use the right hand to hold onto walker, cannot make a complete fist or perform fine motor skills.  He reports his hand is back to his normal.    Objective :  Temp:  [97 °F (36.1 °C)-98.1 °F (36.7 °C)] 98.1 °F (36.7 °C)  HR:  [68-98] 97  BP: (118-151)/(61-95) 134/86  Resp:  [15-20] 16  SpO2:  [92 %-99 %] 95 %  O2 Device: None (Room air)     I/O         10/24 0701  10/25 0700 10/25 0701  10/26 0700 10/26 0701  10/27 0700    P.O.  360 180    I.V. (mL/kg)  200 (2)     IV Piggyback 100      Total Intake(mL/kg) 100 (1) 560 (5.5) 180 (1.8)    Urine (mL/kg/hr)  50 (0)     Total Output  50     Net +100 +510 +180                   Physical Exam  Vitals and nursing note reviewed. Exam conducted with a chaperone present.   HENT:      Head: Normocephalic and atraumatic.   Eyes:      Extraocular Movements: Extraocular movements intact.   Musculoskeletal:         General: Swelling present.   Skin:     General: Skin is warm.   Neurological:       "Mental Status: He is oriented to person, place, and time. Mental status is at baseline.       Pulse exam:  RIGHT medial upper arm incision intact with silver Mepilex.  Partial strikethrough.  No active bleeding.  Moderate ecchymosis and moderate swelling of the entire arm.  Radial, ulnar, palmar arch signal is faint multiphasic with Doppler    Lab Results: I have reviewed the following results:  CBC with diff:   Lab Results   Component Value Date    WBC 6.05 10/25/2024    HGB 11.1 (L) 10/25/2024    HCT 36.0 (L) 10/25/2024    MCV 95 10/25/2024     10/25/2024    RBC 3.80 (L) 10/25/2024    MCH 29.2 10/25/2024    MCHC 30.8 (L) 10/25/2024    RDW 17.8 (H) 10/25/2024    MPV 9.8 10/25/2024    NRBC 0 10/25/2024   ,   BMP/CMP:  Lab Results   Component Value Date    SODIUM 138 10/25/2024    SODIUM 135 09/14/2024    K 3.5 10/25/2024    K 3.8 09/14/2024     10/25/2024     09/14/2024    CO2 24 10/25/2024    CO2 23 09/14/2024    BUN 40 (H) 10/25/2024    BUN 40 (H) 09/14/2024    CREATININE 7.11 (H) 10/25/2024    CREATININE 6.17 (H) 09/14/2024    CALCIUM 9.2 10/25/2024    CALCIUM 9.1 09/14/2024    AST 13 10/25/2024    AST 20 09/04/2024    ALT <3 (L) 10/25/2024    ALT <3 09/04/2024    ALKPHOS 111 (H) 10/25/2024    ALKPHOS 101 09/04/2024    EGFR 6 10/25/2024    EGFR 9 (L) 09/14/2024   ,   Lipid Panel: No results found for: \"CHOL\",   Coags:   Lab Results   Component Value Date    PT 15.7 (H) 09/03/2024    PTT 36 (H) 10/25/2024    INR 1.54 (H) 10/25/2024    INR 1.3 09/03/2024   ,   Blood Culture: No results found for: \"BLOODCX\",   Urinalysis: No results found for: \"COLORU\", \"CLARITYU\", \"SPECGRAV\", \"PHUR\", \"LEUKOCYTESUR\", \"NITRITE\", \"PROTEINUA\", \"GLUCOSEU\", \"KETONESU\", \"BILIRUBINUR\", \"BLOODU\",   Urine Culture: No results found for: \"URINECX\",   Wound Culure: No results found for: \"WOUNDCULT\"    "

## 2024-10-26 NOTE — ASSESSMENT & PLAN NOTE
Status post right upper extremity aVF creation on 10/23/2024 and ligation 10/25/2024.  General surgery team is following.  Vascular surgery team following due to concerns for right upper extremity ischemia.  Again, status post ligation.  Currently utilizing PermCath for HD.   per patient request

## 2024-10-26 NOTE — PLAN OF CARE
Target UF Goal  2.3  L as tolerated. Patient dialyzing for  3.5 hours  on 4 K bath for serum K of  3.2  per protocol. Treatment plan reviewed with Nephrology.     Problem: METABOLIC, FLUID AND ELECTROLYTES - ADULT  Goal: Electrolytes maintained within normal limits  Description: INTERVENTIONS:  - Monitor labs and assess patient for signs and symptoms of electrolyte imbalances  - Administer electrolyte replacement as ordered  - Monitor response to electrolyte replacements, including repeat lab results as appropriate  - Instruct patient on fluid and nutrition as appropriate  Outcome: Progressing  Goal: Fluid balance maintained  Description: INTERVENTIONS:  - Monitor labs   - Monitor I/O and WT  - Instruct patient on fluid and nutrition as appropriate  - Assess for signs & symptoms of volume excess or deficit  Outcome: Progressing

## 2024-10-26 NOTE — ASSESSMENT & PLAN NOTE
Blood pressure remains stable and acceptable.  Currently not on antihypertensives.  Utilize midodrine as needed for intradialytic hypotension.

## 2024-10-26 NOTE — HEMODIALYSIS
Post-Dialysis RN Treatment Note    Blood Pressure:  Pre 130/86 mm/Hg  Post 133/78 mmHg   EDW  97.5 kg    Weight:  Pre 99.3 kg   Post 97 kg   Mode of weight measurement: Bed Scale   Volume Removed  2300 ml    Treatment duration  3.5 hours (pt requested)   NS given  No    Treatment shortened? Yes, describe: pt requested to only run for 3.5 hours as that is what he normally does   Medications given during Rx None Reported   Estimated Kt/V  Not Applicable   Access type: Permacath/TDC   Access Issues: Yes, describe: Lines reversed      Verbal Report to primary nurse   Yes  Zenia Moreno, RN    Hep B panel drawn today - please check results

## 2024-10-26 NOTE — ASSESSMENT & PLAN NOTE
Following Monday Wednesday Friday schedule at [facility] in [place].  Last HD Tuesday, 10/22/2024.  Plan for HD today with the next treatment on Monday to get him back on schedule.  Dry weight: 97.5 kg.

## 2024-10-26 NOTE — NURSING NOTE
AVS/Discharge instructions with incision care/ dressing change reviewed with patient and his wife. Questions/concerns addressed, both verbalized their understanding of same. Left unit via wheelchair, accompanied by PCA. Wife transp. Home.

## 2024-10-26 NOTE — DISCHARGE SUMMARY
Discharge Summary - Hospitalist   Name: Alexandro Jefferson 74 y.o. male I MRN: [id number]  Unit/Bed#: E5 -01 I Date of Admission: 10/25/2024   Date of Service: 10/26/2024 I Hospital Day: 1     Assessment & Plan  ESRD (end stage renal disease) on dialysis ([facility])  Lab Results   Component Value Date    EGFR 5 10/26/2024    EGFR 6 10/25/2024    EGFR 11 10/19/2024    CREATININE 8.79 (H) 10/26/2024    CREATININE 7.11 (H) 10/25/2024    CREATININE 4.51 (H) 10/19/2024   Reports secondary to acute pancreatitis resulting in KITTY and now ESRD  Follows with [facility] nephrology and receives dialysis at [facility]  Continue Monday Wednesday Friday schedule-had extra dialysis session on Saturday 10/26 as he missed previous session  Continue Renvela  AVF (arteriovenous fistula) ([facility])  Presents with 1 day history of right arm arm pain with concern for possible limb ischemia  Recent fistula creation of the right arm 10/23/2024  Vascular surgery-underwent ligation 10/26 and has good perfusion today, vascular cleared for discharge recommended to start Eliquis tomorrow  Patient does have alternative vascular access including permacath, previous left-sided fistula creation  CT scan:  Status post right upper extremity brachiobasilic arteriovenous fistula. The actual anastomosis is obscured by beam hardening artifact, but no significant stenosis identified elsewhere. No hematoma or evidence for active bleedin    BMI 40.0-44.9, adult ([facility])  Body mass index is 31.41 kg/m².  Recommend incorporating a more whole foods plant-predominant diet along with decreasing consumption of red meats and processed foods  Per AHA guidelines, recommend moderate-vigorous intensity exercise for 30 minutes a day for 5 days a week or a total of 150 min/week    History of endocarditis  History of endocarditis with completion of antibiotic therapy 9/15/2024  Received course of cefazolin for methicillin sensitive Staph aureus  Paroxysmal atrial  "fibrillation (HCC)  Ventricle rate controlled  Dosage of amiodarone recently increased to 200 mg by cardiology  Follows with [facility] cardiologist Dr. Filippo Nogueira on hold due to need for operative management  Chronic diarrhea  On Questran twice daily  Continue same for now, had previous issues with dehydration when discontinue  Diabetes (HCC)  Lab Results   Component Value Date    HGBA1C 4.5 08/23/2024       No results for input(s): \"POCGLU\" in the last 72 hours.    Blood Sugar Average: Last 72 hrs:  Reported history of diabetes although now no longer on medication  Last A1c 4.5    Peptic ulcer disease  Continue famotidine     Discharging Physician / Practitioner: Gavin Alas PA-C  PCP: Nehemias Sanchez MD  Admission Date:   Admission Orders (From admission, onward)       Ordered        10/25/24 1114  INPATIENT ADMISSION  Once                          Discharge Date: 10/26/24    Consultations During Hospital Stay:  IP CONSULT TO VASCULAR SURGERY  IP CONSULT TO NEPHROLOGY    Procedures Performed:   aVF ligation    Significant Findings / Test Results:   CTA upper extremity right w wo contrast    Result Date: 10/25/2024  Impression: Status post right upper extremity brachiobasilic arteriovenous fistula. The actual anastomosis is obscured by beam hardening artifact, but no significant stenosis identified elsewhere. No hematoma or evidence for active bleeding. Ultrasound may be obtained as warranted for further work-up. Partially imaged inflammatory changes surrounding the proximal duodenum, correlate clinically for duodenitis or peptic ulcer disease. Workstation performed: [id number]       No Chest XR results available for this patient.     No results found for this or any previous visit.      No results for input(s): \"BLOODCX\", \"SPUTUMCULTUR\", \"GRAMSTAIN\", \"URINECX\", \"WOUNDCULT\", \"BODYFLUIDCUL\", \"MRSACULTURE\", \"INFLUAPCR\", \"INFLUBPCR\", \"RSVPCR\", \"LEGIONELLAUR\", \"STPU\", \"CDIFFTOXINB\" in the last 72 " hours.    Incidental Findings:   None other than noted above; I reviewed the above mentioned incidental findings with the patient and/or family and they expressed understanding.    Test Results Pending at Discharge (will require follow up):   None     Outpatient Tests Requested:  None    Complications:  None    Reason for Admission:   Chief Complaint   Patient presents with    Medical Problem     Patient arrived via ems from home with complaint of right arm pain and bleeding that started tonight. Patient states he had a surgery on the right arm on Wednesday. Patient states she does take thinners.          Hospital Course:   Patient initially presented after having 1 day history of right arm pain with concern for possible limb ischemia.  A recent visualization was created on 10/23.  The patient returned and was evaluated by the vascular team.  Ultimately the vascular team performed an aVF ligation on day of admission.  Patient was then evaluated overnight and as he missed his dialysis session, patient had a make-up dialysis session on 10/26.  Patient continued to have good perfusion and is medically stable and able to be discharged home on 10/26.  At this time patient is medically stable for discharge and agreeable for plan at discharge.  For further information regards course hospitalization, please see notes attached.      Please see above list of diagnoses and related plan for additional information.     Condition at Discharge: good    Discharge Day Visit / Exam:   Subjective: Patient reports be feeling well.  Currently denies any chest pain/pressure, palpitations, lightness, nausea, shortness of breath, or chills.  Offers no new complaints at this time. No acute events reported overnight. Understanding of plan.  All questions answered to the best of my ability at this time to patient satisfaction. Plan of care agreed upon.  Vitals: Blood Pressure: 136/83 (10/26/24 1230)  Pulse: 85 (10/26/24 1230)  Temperature: 98  "°F (36.7 °C) (10/26/24 0925)  Temp Source: Oral (10/26/24 0925)  Respirations: 16 (10/26/24 1230)  Height: 5' 10\" (177.8 cm) (10/25/24 1630)  Weight - Scale: 99.3 kg (218 lb 14.7 oz) (10/26/24 0925)  SpO2: 95 % (10/26/24 1230)  Exam:   Physical Exam  Vitals and nursing note reviewed.   Constitutional:       General: He is not in acute distress.     Appearance: He is normal weight. He is not ill-appearing, toxic-appearing or diaphoretic.   HENT:      Head: Normocephalic.      Nose: Nose normal.      Mouth/Throat:      Mouth: Mucous membranes are moist.      Pharynx: Oropharynx is clear.   Eyes:      General: No scleral icterus.     Conjunctiva/sclera: Conjunctivae normal.      Pupils: Pupils are equal, round, and reactive to light.   Cardiovascular:      Rate and Rhythm: Normal rate and regular rhythm.      Heart sounds: No murmur heard.     No friction rub. No gallop.   Pulmonary:      Effort: Pulmonary effort is normal. No respiratory distress.      Breath sounds: Normal breath sounds. No stridor. No wheezing, rhonchi or rales.   Abdominal:      General: Abdomen is flat.      Palpations: Abdomen is soft.   Musculoskeletal:         General: Normal range of motion.      Cervical back: Normal range of motion and neck supple.      Right lower leg: No edema.      Left lower leg: No edema.      Comments: Right arm has dressing postsurgical, dressing peers to be clean, intact and dry.  Right lower extremity has good perfusion, warm, nontender   Lymphadenopathy:      Cervical: No cervical adenopathy.   Skin:     General: Skin is warm.      Coloration: Skin is not jaundiced or pale.      Findings: No bruising, erythema or lesion.   Neurological:      General: No focal deficit present.      Mental Status: He is alert and oriented to person, place, and time. Mental status is at baseline.      Cranial Nerves: No cranial nerve deficit.      Motor: No weakness.   Psychiatric:         Mood and Affect: Mood normal.         " Behavior: Behavior normal.         Thought Content: Thought content normal.          Discussion with Family: Patient declined call to .     Discharge instructions/Information to patient and family:   See after visit summary for information provided to patient and family.      Provisions for Follow-Up Care:  See after visit summary for information related to follow-up care and any pertinent home health orders.       Mobility at time of Discharge:   Basic Mobility Inpatient Raw Score: 15  JH-HLM Goal: 4: Move to chair/commode  JH-HLM Achieved: 3: Sit at edge of bed  HLM Goal achieved. Continue to encourage appropriate mobility.    Disposition:   Home    Planned Readmission: none     Discharge Statement:  I spent 65 minutes discharging the patient. This time was spent on the day of discharge. I had direct contact with the patient on the day of discharge. Greater than 50% of the total time was spent examining patient, answering all patient questions, arranging and discussing plan of care with patient as well as directly providing post-discharge instructions.  Additional time then spent on discharge activities.    Discharge Medications:  See after visit summary for reconciled discharge medications provided to patient and/or family.      **Please Note: This note may have been constructed using a voice recognition system**

## 2024-10-26 NOTE — PROGRESS NOTES
Progress Note - Nephrology   Name: Alexandro Jefferson 74 y.o. male I MRN: [id number]  Unit/Bed#: E5 -01 I Date of Admission: 10/25/2024   Date of Service: 10/26/2024 I Hospital Day: 1       HEMODIALYSIS PROCEDURE NOTE  The patient was seen and examined on hemodialysis.  Time: 4 hours  Sodium: 138 Blood flow: 400   Dialyzer: F160 Potassium: 4 K Dialysate flow: 1.5 x BFR   Access: CVC Bicarbonate: 35 Ultrafiltration goal: 2.3 L   Medications on HD:    The patient was seen and examined on hemodialysis today.  Currently denies any complaints.  He reports improvement with his right upper extremity hand pain.  We are attempting for 2.3 L goal over 3.5-hour treatment with lines reversed.  Patient's blood pressure is stable.  He denies chest pain or shortness of breath.  Assessment & Plan  ESRD (end stage renal disease) on dialysis (HCC)  Following Monday Wednesday Friday schedule at [facility].  Last HD Tuesday, 10/22/2024.  Plan for HD today with the next treatment on Monday to get him back on schedule.  Dry weight: 97.5 kg.  AVF (arteriovenous fistula) ([facility])  Status post right upper extremity aVF creation on 10/23/2024 and ligation 10/25/2024.  General surgery team is following.  Vascular surgery team following due to concerns for right upper extremity ischemia.  Again, status post ligation.  Currently utilizing PermCath for HD.  Primary hypertension  Blood pressure remains stable and acceptable.  Currently not on antihypertensives.  Utilize midodrine as needed for intradialytic hypotension.  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  Anemia due to chronic kidney disease, on chronic dialysis (HCC)  Continue to monitor and transfuse as needed for hemoglobin less than 7.0.  On outpatient Mircera 200 mcg every 2 weeks.  Hemoglobin  currently at goal for ESRD.    Other: A-fib, diabetes, PE, history of endocarditis.     I have discussed the above management plan in detail with the primary service.     Subjective   Brief History of Admission -74-year-old male with history of ESRD on HD with recent right upper extremity aVF creation, who presents with right hand and numbness and pain.  Nephrology is consulted for management of dialysis.  Patient is status post AVF ligation 10/25.        Objective :  Temp:  [97 °F (36.1 °C)-98.1 °F (36.7 °C)] 98.1 °F (36.7 °C)  HR:  [68-98] 97  BP: (118-151)/(61-95) 134/86  Resp:  [15-20] 16  SpO2:  [92 %-99 %] 95 %  O2 Device: None (Room air)    Current Weight: Weight - Scale: 101 kg (223 lb 12.3 oz)  First Weight: Weight - Scale: 99.6 kg (219 lb 9.3 oz)  I/O         10/24 0701  10/25 0700 10/25 0701  10/26 0700 10/26 0701  10/27 0700    P.O.  360 180    I.V. (mL/kg)  200 (2)     IV Piggyback 100      Total Intake(mL/kg) 100 (1) 560 (5.5) 180 (1.8)    Urine (mL/kg/hr)  50 (0)     Total Output  50     Net +100 +510 +180                 Physical Exam  Vitals reviewed.   Constitutional:       Appearance: Normal appearance.   HENT:      Head: Normocephalic.      Nose: Nose normal.      Mouth/Throat:      Mouth: Mucous membranes are moist.   Cardiovascular:      Heart sounds: Normal heart sounds.   Pulmonary:      Breath sounds: Normal breath sounds.   Abdominal:      Palpations: Abdomen is soft.   Musculoskeletal:      Right lower leg: No edema.      Left lower leg: No edema.      Comments: RUE wrapped with ACE  CVC intact    Skin:     General: Skin is warm and dry.   Neurological:      General: No focal deficit present.      Mental Status: He is alert.   Psychiatric:         Mood and Affect: Mood normal.       Medications:    Current Facility-Administered Medications:     acetaminophen (TYLENOL) tablet 975 mg, 975 mg, Oral, Q6H PRN, Tan Warner MD, 975 mg at 10/25/24 1703    aluminum-magnesium  "hydroxide-simethicone (MAALOX) oral suspension 30 mL, 30 mL, Oral, Q6H PRN, Marshall Carrasquillo DO    amiodarone tablet 200 mg, 200 mg, Oral, Daily, Marshall Carrasquillo DO, 200 mg at 10/26/24 0812    [START ON 10/28/2024] calcitriol (ROCALTROL) capsule 1.25 mcg, 1.25 mcg, Oral, Once per day on Monday Wednesday Friday, Marshall Carrasquillo DO    calcium carbonate (TUMS) chewable tablet 1,000 mg, 1,000 mg, Oral, Daily PRN, Marshall Carrasquillo DO    cholestyramine sugar free (QUESTRAN LIGHT) packet 4 g, 4 g, Oral, BID, Marshall Carrasquillo DO, 4 g at 10/26/24 0812    famotidine (PEPCID) tablet 20 mg, 20 mg, Oral, Once per day on Monday Wednesday Friday, Marshall Carrasquillo DO, 20 mg at 10/25/24 1703    folic acid (FOLVITE) tablet 1 mg, 1 mg, Oral, Daily, Marshall Carrasquillo DO, 1 mg at 10/26/24 0812    heparin (porcine) subcutaneous injection 5,000 Units, 5,000 Units, Subcutaneous, Q8H HUMAIRA, Marshall Carrasquillo DO, 5,000 Units at 10/26/24 0558    HYDROmorphone (DILAUDID) injection 0.2 mg, 0.2 mg, Intravenous, Q3H PRN, Tan Warner MD    ondansetron (ZOFRAN) injection 4 mg, 4 mg, Intravenous, Q6H PRN, Marshall Carrasquillo DO    sevelamer (RENAGEL) tablet 2,400 mg, 2,400 mg, Oral, TID With Meals, Marshall Carrasquillo DO, 2,400 mg at 10/26/24 0737      Lab Results: I have reviewed the following results:  Results from last 7 days   Lab Units 10/25/24  0408 10/23/24  1045 10/19/24  0955   WBC Thousand/uL 6.05  --  4.33   HEMOGLOBIN g/dL 11.1*  --  10.0*   HEMATOCRIT % 36.0*  --  33.9*   PLATELETS Thousands/uL 200  --  231   POTASSIUM mmol/L 3.5 3.2* 3.3*   CHLORIDE mmol/L 101  --  99   CO2 mmol/L 24  --  31   BUN mg/dL 40*  --  19   CREATININE mg/dL 7.11*  --  4.51*   CALCIUM mg/dL 9.2  --  9.5   ALBUMIN g/dL 3.1*  --   --        Administrative Statements     Portions of the record may have been created with voice recognition software. Occasional wrong word or \"sound a like\" substitutions may have occurred due to the inherent limitations of " voice recognition software. Read the chart carefully and recognize, using context, where substitutions have occurred.If you have any questions, please contact the dictating provider.

## 2024-10-26 NOTE — PLAN OF CARE
Problem: Potential for Falls  Goal: Patient will remain free of falls  Description: INTERVENTIONS:  - Educate patient/family on patient safety including physical limitations  - Instruct patient to call for assistance with activity   - Consult OT/PT to assist with strengthening/mobility   - Keep Call bell within reach  - Keep bed low and locked with side rails adjusted as appropriate  - Keep care items and personal belongings within reach  - Initiate and maintain comfort rounds  - Make Fall Risk Sign visible to staff  - Offer Toileting every  Hours, in advance of need  - Initiate/Maintain alarm  - Obtain necessary fall risk management equipment:  - Apply yellow socks and bracelet for high fall risk patients  - Consider moving patient to room near nurses station  Outcome: Progressing     Problem: PAIN - ADULT  Goal: Verbalizes/displays adequate comfort level or baseline comfort level  Description: Interventions:  - Encourage patient to monitor pain and request assistance  - Assess pain using appropriate pain scale  - Administer analgesics based on type and severity of pain and evaluate response  - Implement non-pharmacological measures as appropriate and evaluate response  - Consider cultural and social influences on pain and pain management  - Notify physician/advanced practitioner if interventions unsuccessful or patient reports new pain  Outcome: Progressing     Problem: INFECTION - ADULT  Goal: Absence or prevention of progression during hospitalization  Description: INTERVENTIONS:  - Assess and monitor for signs and symptoms of infection  - Monitor lab/diagnostic results  - Monitor all insertion sites, i.e. indwelling lines, tubes, and drains  - Monitor endotracheal if appropriate and nasal secretions for changes in amount and color  - [place] appropriate cooling/warming therapies per order  - Administer medications as ordered  - Instruct and encourage patient and family to use good hand hygiene technique  -  Identify and instruct in appropriate isolation precautions for identified infection/condition  Outcome: Progressing  Goal: Absence of fever/infection during neutropenic period  Description: INTERVENTIONS:  - Monitor WBC    Outcome: Progressing     Problem: SAFETY ADULT  Goal: Patient will remain free of falls  Description: INTERVENTIONS:  - Educate patient/family on patient safety including physical limitations  - Instruct patient to call for assistance with activity   - Consult OT/PT to assist with strengthening/mobility   - Keep Call bell within reach  - Keep bed low and locked with side rails adjusted as appropriate  - Keep care items and personal belongings within reach  - Initiate and maintain comfort rounds  - Make Fall Risk Sign visible to staff  - Offer Toileting every  Hours, in advance of need  - Initiate/Maintain alarm  - Obtain necessary fall risk management equipment:  - Apply yellow socks and bracelet for high fall risk patients  - Consider moving patient to room near nurses station  Outcome: Progressing  Goal: Maintain or return to baseline ADL function  Description: INTERVENTIONS:  -  Assess patient's ability to carry out ADLs; assess patient's baseline for ADL function and identify physical deficits which impact ability to perform ADLs (bathing, care of mouth/teeth, toileting, grooming, dressing, etc.)  - Assess/evaluate cause of self-care deficits   - Assess range of motion  - Assess patient's mobility; develop plan if impaired  - Assess patient's need for assistive devices and provide as appropriate  - Encourage maximum independence but intervene and supervise when necessary  - Involve family in performance of ADLs  - Assess for home care needs following discharge   - Consider OT consult to assist with ADL evaluation and planning for discharge  - Provide patient education as appropriate  Outcome: Progressing  Goal: Maintains/Returns to pre admission functional level  Description: INTERVENTIONS:  -  Perform AM-PAC 6 Click Basic Mobility/ Daily Activity assessment daily.  - Set and communicate daily mobility goal to care team and patient/family/caregiver.   - Collaborate with rehabilitation services on mobility goals if consulted  - Perform Range of Motion  times a day.  - Reposition patient every  hours.  - Dangle patient  times a day  - Stand patient  times a day  - Ambulate patient  times a day  - Out of bed to chair  times a day   - Out of bed for meals  times a day  - Out of bed for toileting  - Record patient progress and toleration of activity level   Outcome: Progressing     Problem: DISCHARGE PLANNING  Goal: Discharge to home or other facility with appropriate resources  Description: INTERVENTIONS:  - Identify barriers to discharge w/patient and caregiver  - Arrange for needed discharge resources and transportation as appropriate  - Identify discharge learning needs (meds, wound care, etc.)  - Arrange for interpretive services to assist at discharge as needed  - Refer to Case Management Department for coordinating discharge planning if the patient needs post-hospital services based on physician/advanced practitioner order or complex needs related to functional status, cognitive ability, or social support system  Outcome: Progressing     Problem: Knowledge Deficit  Goal: Patient/family/caregiver demonstrates understanding of disease process, treatment plan, medications, and discharge instructions  Description: Complete learning assessment and assess knowledge base.  Interventions:  - Provide teaching at level of understanding  - Provide teaching via preferred learning methods  Outcome: Progressing     Problem: MOBILITY - ADULT  Goal: Maintain or return to baseline ADL function  Description: INTERVENTIONS:  -  Assess patient's ability to carry out ADLs; assess patient's baseline for ADL function and identify physical deficits which impact ability to perform ADLs (bathing, care of mouth/teeth, toileting,  grooming, dressing, etc.)  - Assess/evaluate cause of self-care deficits   - Assess range of motion  - Assess patient's mobility; develop plan if impaired  - Assess patient's need for assistive devices and provide as appropriate  - Encourage maximum independence but intervene and supervise when necessary  - Involve family in performance of ADLs  - Assess for home care needs following discharge   - Consider OT consult to assist with ADL evaluation and planning for discharge  - Provide patient education as appropriate  Outcome: Progressing  Goal: Maintains/Returns to pre admission functional level  Description: INTERVENTIONS:  - Perform AM-PAC 6 Click Basic Mobility/ Daily Activity assessment daily.  - Set and communicate daily mobility goal to care team and patient/family/caregiver.   - Collaborate with rehabilitation services on mobility goals if consulted  - Perform Range of Motion  times a day.  - Reposition patient every  hours.  - Dangle patient  times a day  - Stand patient  times a day  - Ambulate patient  times a day  - Out of bed to chair  times a day   - Out of bed for meals  times a day  - Out of bed for toileting  - Record patient progress and toleration of activity level   Outcome: Progressing     Problem: Prexisting or High Potential for Compromised Skin Integrity  Goal: Skin integrity is maintained or improved  Description: INTERVENTIONS:  - Identify patients at risk for skin breakdown  - Assess and monitor skin integrity  - Assess and monitor nutrition and hydration status  - Monitor labs   - Assess for incontinence   - Turn and reposition patient  - Assist with mobility/ambulation  - Relieve pressure over bony prominences  - Avoid friction and shearing  - Provide appropriate hygiene as needed including keeping skin clean and dry  - Evaluate need for skin moisturizer/barrier cream  - Collaborate with interdisciplinary team   - Patient/family teaching  - Consider wound care consult   Outcome:  Progressing

## 2024-10-26 NOTE — ASSESSMENT & PLAN NOTE
Continue to monitor and transfuse as needed for hemoglobin less than 7.0.  On outpatient Mircera 200 mcg every 2 weeks.  Hemoglobin currently at goal for ESRD.

## 2024-10-26 NOTE — ASSESSMENT & PLAN NOTE
Continue calcitriol 1.25 mcg with HD, Sensipar 30 mg 3 times per week, and Renagel 2.4 g with meals.

## 2024-10-26 NOTE — DISCHARGE INSTR - AVS FIRST PAGE
DISCHARGE INSTRUCTIONS  ARM SURGERY    ACTIVITY:    Limit use of the operative arm for the first day after surgery. You may start to increase use of your arm as tolerated.  Avoid strenuous activity such as vigorous exercise.  Avoid heavy lifting (do not lift more than 15 pounds) for the first week after surgery.    DIET:   Resume your normal diet.  Good nutrition is important for healing of your incision.    INCISION:   Your surgeon may have used surgical glue to close your incision. There are stitches present under the skin which will absorb on their own. The glue is used to cover the incision, assist in closure, and prevent contamination. This adhesive will darken and peel away on its own within one to two weeks.    You may have stitches or staples at your incision and these will be removed at your postoperative visit.    If you have a dressing covering your incision, this can be removed on day 2 after surgery.    Wash incision daily with soap and water, but do not rub or scrub the incision; rinse thoroughly and pat dry.  It is normal to have mild swelling or discoloration around the incision.   If increasing redness or pain develops, call our office immediately.  Numbness in the region of the incision may occur following the surgery.  This normally improves over six to twelve months.      ARM SWELLING: Most patients have some noticeable arm swelling after surgery.  This usually improves within a few weeks.  If swelling is present, elevate the arm whenever possible.  You may be able to use an ACE wrap to help with swelling, but please discuss this with your physician first. Elevate your arm as much as possible during the day.     FOLLOW UP APPOINTMENTS:  Making and keeping follow up appointments and ultrasound tests are important to your recovery.  If you have difficulty making it to or keeping your follow up appointments, call the office.    If you have increased pain, fever >101.5, increased drainage, redness or  a bad smell at your surgery site, new coldness/numbness of your arm or leg, please call us immediately and GO directly to the ER.    Please do not take your Eliquis today and restart taking your Eliquis tomorrow.      PLEASE CALL THE OFFICE IF YOU HAVE ANY QUESTIONS  [phone]  [phone] [address], [address], [place] [address]  [address] [place] [address]  [address] [place] [address], [place] [address]  [address] [facility], 1st Floor, [place] [address]  [address] [facility], [address], [place] [address]  [address] [facility], [address], [place] [address]  [address] [facility], [facility], 2nd Floor, [place] [address]  [address] [facility], 1st Floor, [address] [address]  [address], [facility], [place] [address]  [address] [facility], [address], [place] [address]

## 2024-10-28 ENCOUNTER — TELEPHONE (OUTPATIENT)
Dept: VASCULAR SURGERY | Facility: CLINIC | Age: 74
End: 2024-10-28

## 2024-10-28 NOTE — TELEPHONE ENCOUNTER
Vascular Nurse Navigator Post Op Call    Procedure: Single Stage Right Brachio-basilic Arteriovenous Fistula Creation     Date of Procedure: 10/23/24    Surgeon:   * Olivia Ball DO - Primary     * Tan Warner MD - Assisting     * Ramsey Portillo DO - Assisting      Patient readmitted to [facility] 10/25/24 to 10/26/24 with Steal symptoms    Procedure: LIGATION OF RIGHT UPPER EXTREMITY BRACHIOBASILIC AV FISTULA     Date of Procedure: 10/25/24    Surgeon:   * Michael Lamar MD - Primary     * Tan Warner MD - Assisting     * Ramsey Portillo DO - Fellow      Painful tingling or numbness in your fingers?: No    Paleness/Coolness in hands/fingers?: No    Redness, swelling or pus from your wound?: No    Bleeding?: No    Thrill present?: fistula ligated    Anticoagulation pt was discharged on post op?: Apixaban (Eliquis)    Fever/chills?: No    Uncontrolled Pain?: No      Reviewed discharge instructions and incision care with patient.      Dialysis Days and Location:  MWF at [facility]    NEXT SCHEDULED OFFICE VISIT:  11/12/24 at 2:30 pm with Dr. Ball at [facility]    Transportation Confirmed?: Yes      Any Questions or Concerns?    Spoke with patient's wife Merced in regards to above.  She stated that patient is doing good since discharge.  She stated that she removed the dressing yesterday as IJEOMA Mar advised.  She stated that incision looks good and without any signs of infection.  She stated that there is a small amount of serous drainage from his incision.  She stated that she is changing the dressing once a day.  Reviewed incision care with her - wash daily with soap and water.  All questions answered.  No concerns expressed at this time.

## 2024-11-01 ENCOUNTER — CLINICAL SUPPORT (OUTPATIENT)
Dept: CARDIOLOGY CLINIC | Facility: CLINIC | Age: 74
End: 2024-11-01
Payer: MEDICARE

## 2024-11-01 DIAGNOSIS — I48.0 PAROXYSMAL ATRIAL FIBRILLATION (HCC): ICD-10-CM

## 2024-11-01 DIAGNOSIS — Z01.810 PREOP CARDIOVASCULAR EXAM: ICD-10-CM

## 2024-11-03 ENCOUNTER — DOCUMENTATION (OUTPATIENT)
Dept: CARDIOLOGY CLINIC | Facility: CLINIC | Age: 74
End: 2024-11-03

## 2024-11-03 PROCEDURE — 93248 EXT ECG>7D<15D REV&INTERPJ: CPT | Performed by: INTERNAL MEDICINE

## 2024-11-04 ENCOUNTER — TELEPHONE (OUTPATIENT)
Dept: CARDIOLOGY CLINIC | Facility: CLINIC | Age: 74
End: 2024-11-04

## 2024-11-04 NOTE — PROGRESS NOTES
[facility] EXTENDED HOLTER MONITORING REVIEW REPORT    -- Patient was monitored for 13 days 20 hours between 10/8/2024 and 10/22/2024.  Indication for monitoring was for detection of atrial fibrillation.  -- Atrial Fibrillation/Flutter occurred continuously (100% burden), ranging from  bpm (avg of 90 bpm).  Bundle branch block conduction abnormality was noted.  -- There was no occurrence of other supraventricular tachycardia or ventricular tachycardia or Mobitz type II or higher degree AV block or clinically significant pause events.  -- Isolated VEs were rare (<1.0%), VE Couplets were rare (<1.0%), and no VE Triplets were present.   -- There were 2 triggered events and 1 diary entry.  Triggered events correlated with artifact.  Diary entry included feeling of sharp jab while watching television.  Rhythm at the time was atrial fibrillation with heart rate ranging  bpm.    Conclusion: Abnormal extended Holter monitor findings with predominant atrial fibrillation rhythm.  Heart rate control in atrial fibrillation was normal.  Patient is already on anticoagulation and rate control medications.  He is on reduced dose of amiodarone.  Amiodarone dose was recently increased to 200 mg once daily.  Cardioversion to restore sinus rhythm can be considered in the future following discussion of risks, benefits and alternatives.    Ana Lilia Barkley MD

## 2024-11-04 NOTE — TELEPHONE ENCOUNTER
----- Message from Ana Lilia Barkley MD sent at 11/3/2024  8:09 PM EST -----  [facility] EXTENDED HOLTER MONITORING REVIEW REPORT    -- Patient was monitored for 13 days 20 hours between 10/8/2024 and 10/22/2024.  Indication for monitoring was for detection of atrial fibrillation.  -- Atrial Fibrillation/Flutter occurred continuously (100% burden), ranging from  bpm (avg of 90 bpm).  Bundle branch block conduction abnormality was noted.  -- There was no occurrence of other supraventricular tachycardia or ventricular tachycardia or Mobitz type II or higher degree AV block or clinically significant pause events.  -- Isolated VEs were rare (<1.0%), VE Couplets were rare (<1.0%), and no VE Triplets were present.   -- There were 2 triggered events and 1 diary entry.  Triggered events correlated with artifact.  Diary entry included feeling of sharp jab while watching television.  Rhythm at the time was atrial fibrillation with heart rate ranging  bpm.    Conclusion: Abnormal extended Holter monitor findings with predominant atrial fibrillation rhythm.  Heart rate control in atrial fibrillation was normal.  Patient is already on anticoagulation and rate control medications.  He is on reduced dose of amiodarone.  Amiodarone dose was recently increased to 200 mg once daily.  Cardioversion to restore sinus rhythm can be considered in the future following discussion of risks, benefits and alternatives.    Ana Lilia Barkley MD

## 2024-11-12 ENCOUNTER — OFFICE VISIT (OUTPATIENT)
Dept: VASCULAR SURGERY | Facility: CLINIC | Age: 74
End: 2024-11-12

## 2024-11-12 VITALS
HEART RATE: 83 BPM | DIASTOLIC BLOOD PRESSURE: 66 MMHG | OXYGEN SATURATION: 100 % | WEIGHT: 215 LBS | BODY MASS INDEX: 30.78 KG/M2 | HEIGHT: 70 IN | SYSTOLIC BLOOD PRESSURE: 140 MMHG

## 2024-11-12 DIAGNOSIS — Z99.2 ESRD (END STAGE RENAL DISEASE) ON DIALYSIS (HCC): ICD-10-CM

## 2024-11-12 DIAGNOSIS — L03.113 CELLULITIS OF RIGHT UPPER EXTREMITY: Primary | ICD-10-CM

## 2024-11-12 DIAGNOSIS — N18.6 ESRD (END STAGE RENAL DISEASE) ON DIALYSIS (HCC): ICD-10-CM

## 2024-11-12 PROCEDURE — 99024 POSTOP FOLLOW-UP VISIT: CPT | Performed by: SURGERY

## 2024-11-12 RX ORDER — CEPHALEXIN 500 MG/1
500 CAPSULE ORAL EVERY 12 HOURS SCHEDULED
Qty: 14 CAPSULE | Refills: 0 | Status: SHIPPED | OUTPATIENT
Start: 2024-11-12 | End: 2024-11-19

## 2024-11-12 NOTE — ASSESSMENT & PLAN NOTE
"Lab Results   Component Value Date    EGFR 5 10/26/2024    EGFR 6 10/25/2024    EGFR 11 10/19/2024    CREATININE 8.79 (H) 10/26/2024    CREATININE 7.11 (H) 10/25/2024    CREATININE 4.51 (H) 10/19/2024   Alexandro returns to the office for routine postoperative visit.  He underwent a right upper extremity single-stage brachiobasilic AV fistula creation on 10/23.  Unfortunately he had presented to the hospital on 10/25 with complaints of worsening right hand pain numbness and decreased motor function in comparison to his baseline.  He was taken to the operating room by my partner who performed fistula ligation.  He reports that the right hand almost immediately improved.  He still gets occasional \"coldness\" of the hand but that is intermittent.  The right upper extremity incisional staples were removed today in the office.  Along the upper medial aspect of the arm there was some surrounding erythema.  There is no appreciable fluctuance however in the process a drop of purulent material was expressed from the recently removed staple insertion site.  Denies any fevers or chills.  I have prescribed a 1 week course of Keflex for superficial site infection/cellulitis with plans to return to the office in 1 week's time to reassess.  Patient instructed to notify the office sooner should he begin to experience fevers chills and or worsening erythema malodor and or drainage from the incision site.  Patient and wife verbalized understanding.  Patient will be scheduled to see me back in the office next Tuesday and if no significant improvement will require operative exploration, washout, and VAC placement.         "

## 2024-11-12 NOTE — PROGRESS NOTES
"Ambulatory Visit  Name: Alexandro Jefferson      : 1950      MRN: [id number]  Encounter Provider: Olivia Ball DO  Encounter Date: 2024   Encounter department: [facility]    Assessment & Plan  Cellulitis of right upper extremity    Orders:    cephalexin (KEFLEX) 500 mg capsule; Take 1 capsule (500 mg total) by mouth every 12 (twelve) hours for 7 days    ESRD (end stage renal disease) on dialysis (HCC)  Lab Results   Component Value Date    EGFR 5 10/26/2024    EGFR 6 10/25/2024    EGFR 11 10/19/2024    CREATININE 8.79 (H) 10/26/2024    CREATININE 7.11 (H) 10/25/2024    CREATININE 4.51 (H) 10/19/2024   Alexandro returns to the office for routine postoperative visit.  He underwent a right upper extremity single-stage brachiobasilic AV fistula creation on 10/23.  Unfortunately he had presented to the hospital on 10/25 with complaints of worsening right hand pain numbness and decreased motor function in comparison to his baseline.  He was taken to the operating room by my partner who performed fistula ligation.  He reports that the right hand almost immediately improved.  He still gets occasional \"coldness\" of the hand but that is intermittent.  The right upper extremity incisional staples were removed today in the office.  Along the upper medial aspect of the arm there was some surrounding erythema.  There is no appreciable fluctuance however in the process a drop of purulent material was expressed from the recently removed staple insertion site.  Denies any fevers or chills.  I have prescribed a 1 week course of Keflex for superficial site infection/cellulitis with plans to return to the office in 1 week's time to reassess.  Patient instructed to notify the office sooner should he begin to experience fevers chills and or worsening erythema malodor and or drainage from the incision site.  Patient and wife verbalized understanding.  Patient will be scheduled to see me back in the " office next Tuesday and if no significant improvement will require operative exploration, washout, and VAC placement.           History of Present Illness     Patient is here today s/p creation of right AVF done 10/23/2024 and also s/p ligation of RUE AVF done 10/25/2024 by Dr. Lamar. His incision is clean and dry with some redness and swelling in the upper right arm. He denies any fever or chills. Patient is getting dialysis on M-W-F at [facility]. He is taking Eliquis. Patient is a non-smoker.           Alexandro Jefferson is a 74 y.o. male who presents to the office accompanied by his wife for postoperative visit.    History obtained from : patient and wife  Review of Systems   Constitutional: Negative.    HENT: Negative.     Eyes: Negative.    Respiratory: Negative.     Cardiovascular: Negative.    Gastrointestinal: Negative.    Endocrine: Negative.    Genitourinary: Negative.    Musculoskeletal: Negative.    Skin: Negative.    Allergic/Immunologic: Positive for food allergies (shell fish).   Neurological: Negative.    Hematological: Negative.    Psychiatric/Behavioral: Negative.       Past Medical History   Past Medical History:   Diagnosis Date    A-fib (HCC)     Chronic kidney disease     Pulmonary embolism (HCC)      Past Surgical History:   Procedure Laterality Date    CHOLECYSTECTOMY      2022    COLONOSCOPY      FL LUMBAR PUNCTURE DIAGNOSTIC  03/19/2021    FL ARTERIOVENOUS ANASTOMOSIS OPEN DIRECT Right 10/23/2024    Procedure: CREATION FISTULA ARTERIOVENOUS (AV);  Surgeon: Olivia Ball DO;  Location: [facility];  Service: Vascular    FL LIG/BANDING ANGIOACCESS ARTERIOVENOUS FISTULA Right 10/25/2024    Procedure: LIGATION OF RIGHT UPPER EXTREMITY AV FISTULA;  Surgeon: Michael Lamar MD;  Location: [facility];  Service: Vascular     History reviewed. No pertinent family history.  Current Outpatient Medications on File Prior to Visit   Medication Sig Dispense Refill    acetaminophen  "(TYLENOL) 650 mg CR tablet Take 1 tablet (650 mg total) by mouth every 8 (eight) hours as needed for mild pain 30 tablet 0    Acetaminophen 500 MG Take 500 mg by mouth      amiodarone 200 mg tablet Take 1 tablet (200 mg total) by mouth daily 90 tablet 3    B Complex-C-Folic Acid (Lissa-Geno) TABS       Cholecalciferol (Vitamin D3) 50 MCG (2000 UT) capsule Take 2,000 Units by mouth      cholestyramine light 4 g POWD Take 4 g by mouth 2 (two) times a day      famotidine (PEPCID) 20 mg tablet Take 20 mg by mouth 2 (two) times a day      folic acid (FOLVITE) 1 mg tablet       ondansetron (ZOFRAN-ODT) 8 mg disintegrating tablet Apply 8 mg to cheek every 8 (eight) hours as needed      sevelamer carbonate (RENVELA) 800 mg tablet Take 3 tablets (2,400 mg total) by mouth 3 (three) times a day with meals 810 tablet 3    Sucroferric Oxyhydroxide (Velphoro) 500 MG CHEW Chew 1 tablet (500 mg total) 3 (three) times a day with meals 270 tablet 3    Eliquis 5 MG        No current facility-administered medications on file prior to visit.     Allergies   Allergen Reactions    Metformin GI Intolerance and Nausea Only    Cinacalcet Diarrhea and GI Intolerance    Gabapentin GI Intolerance    Oxycodone Confusion     \"Feel spacey\"    Scopolamine Confusion, Dizziness and Hallucinations     Transdermal patch   Incoherent speech    Shellfish Allergy - Food Allergy Other (See Comments)     gout      Current Outpatient Medications on File Prior to Visit   Medication Sig Dispense Refill    acetaminophen (TYLENOL) 650 mg CR tablet Take 1 tablet (650 mg total) by mouth every 8 (eight) hours as needed for mild pain 30 tablet 0    Acetaminophen 500 MG Take 500 mg by mouth      amiodarone 200 mg tablet Take 1 tablet (200 mg total) by mouth daily 90 tablet 3    B Complex-C-Folic Acid (Lissa-Geno) TABS       Cholecalciferol (Vitamin D3) 50 MCG (2000 UT) capsule Take 2,000 Units by mouth      cholestyramine light 4 g POWD Take 4 g by mouth 2 (two) times " "a day      famotidine (PEPCID) 20 mg tablet Take 20 mg by mouth 2 (two) times a day      folic acid (FOLVITE) 1 mg tablet       ondansetron (ZOFRAN-ODT) 8 mg disintegrating tablet Apply 8 mg to cheek every 8 (eight) hours as needed      sevelamer carbonate (RENVELA) 800 mg tablet Take 3 tablets (2,400 mg total) by mouth 3 (three) times a day with meals 810 tablet 3    Sucroferric Oxyhydroxide (Velphoro) 500 MG CHEW Chew 1 tablet (500 mg total) 3 (three) times a day with meals 270 tablet 3    Eliquis 5 MG        No current facility-administered medications on file prior to visit.      Social History     Tobacco Use    Smoking status: Never    Smokeless tobacco: Never   Vaping Use    Vaping status: Never Used   Substance and Sexual Activity    Alcohol use: Not Currently    Drug use: Never    Sexual activity: Not on file         Objective     /66 (BP Location: Left arm, Patient Position: Sitting, Cuff Size: Standard)   Pulse 83   Ht 5' 10\" (1.778 m)   Wt 97.5 kg (215 lb)   SpO2 100%   BMI 30.85 kg/m²     Physical Exam  Constitutional:       General: He is not in acute distress.     Appearance: He is well-developed. He is not ill-appearing or toxic-appearing.   HENT:      Head: Normocephalic and atraumatic.   Eyes:      General: No scleral icterus.     Conjunctiva/sclera: Conjunctivae normal.   Neck:      Trachea: No tracheal deviation.   Cardiovascular:      Rate and Rhythm: Normal rate.   Pulmonary:      Effort: Pulmonary effort is normal.   Abdominal:      Palpations: Abdomen is soft. Mass: no appreciable aortic pulsation/aneurysm.   Musculoskeletal:         General: Normal range of motion.      Cervical back: Normal range of motion and neck supple.   Skin:     General: Skin is warm and dry.      Findings: Erythema present.      Comments: Right upper extremity incisional staples were removed today in the office and Steri-Strips applied.  Along the proximal medial aspect of the right upper extremity there " was some surrounding erythema.  There is no appreciable fluctuance however in the process a drop of purulent appearing fluid was expressed from the recently removed staple site insertion.     Neurological:      Mental Status: He is alert and oriented to person, place, and time.   Psychiatric:         Mood and Affect: Mood normal.         Behavior: Behavior normal.         Thought Content: Thought content normal.         Judgment: Judgment normal.         Administrative Statements   I have spent a total time of 20 minutes in caring for this patient on the day of the visit/encounter including Diagnostic results, Prognosis, Risks and benefits of tx options, Instructions for management, Patient and family education, Importance of tx compliance, Risk factor reductions, Impressions, Counseling / Coordination of care, Documenting in the medical record, Reviewing / ordering tests, medicine, procedures  , and Obtaining or reviewing history  .

## 2024-11-19 ENCOUNTER — OFFICE VISIT (OUTPATIENT)
Dept: VASCULAR SURGERY | Facility: CLINIC | Age: 74
End: 2024-11-19

## 2024-11-19 VITALS
WEIGHT: 213 LBS | SYSTOLIC BLOOD PRESSURE: 124 MMHG | BODY MASS INDEX: 30.49 KG/M2 | DIASTOLIC BLOOD PRESSURE: 80 MMHG | HEIGHT: 70 IN | HEART RATE: 87 BPM | TEMPERATURE: 98 F | OXYGEN SATURATION: 99 %

## 2024-11-19 DIAGNOSIS — N18.6 ESRD (END STAGE RENAL DISEASE) ON DIALYSIS (HCC): Primary | ICD-10-CM

## 2024-11-19 DIAGNOSIS — Z99.2 ESRD (END STAGE RENAL DISEASE) ON DIALYSIS (HCC): Primary | ICD-10-CM

## 2024-11-19 PROCEDURE — 99024 POSTOP FOLLOW-UP VISIT: CPT | Performed by: SURGERY

## 2024-11-19 NOTE — ASSESSMENT & PLAN NOTE
Lab Results   Component Value Date    EGFR 5 10/26/2024    EGFR 6 10/25/2024    EGFR 11 10/19/2024    CREATININE 8.79 (H) 10/26/2024    CREATININE 7.11 (H) 10/25/2024    CREATININE 4.51 (H) 10/19/2024

## 2024-11-19 NOTE — ASSESSMENT & PLAN NOTE
Lab Results   Component Value Date    EGFR 5 10/26/2024    EGFR 6 10/25/2024    EGFR 11 10/19/2024    CREATININE 8.79 (H) 10/26/2024    CREATININE 7.11 (H) 10/25/2024    CREATININE 4.51 (H) 10/19/2024   Don returns to the office accompanied by his wife for a right upper extremity luis-incisional wound/erythema recheck.  He underwent a single-stage right upper extremity brachiobasilic AV fistula creation.  Unfortunately he developed significant steal and required return to the OR with one of my partners for fistula ligation.  He was seen in the office last week at which time his staples were removed.  The proximal half of his right arm incision has some surrounding erythema and able to manually express minimal purulent material.  He denied any fevers or chills.  He continues to deny any fevers or chills.  He completed a course of antibiotics and returns to the office today for recheck.  The erythema has dramatically improved although does persist.  There is no further drainage.  Denies any fevers or chills.  Will continue with close observation.  Should patient develop any worsening erythema drainage fevers chills or any other concerns he understands to call the office to notify us.  Otherwise I will see him back in approximate 4 weeks for a recheck.

## 2024-11-19 NOTE — PROGRESS NOTES
Name: Alexandro Jefferson      : 1950      MRN: [id number]  Encounter Provider: Olivia Ball DO  Encounter Date: 2024   Encounter department: [facility] [place]  :  Assessment & Plan  ESRD (end stage renal disease) on dialysis (HCC)  Lab Results   Component Value Date    EGFR 5 10/26/2024    EGFR 6 10/25/2024    EGFR 11 10/19/2024    CREATININE 8.79 (H) 10/26/2024    CREATININE 7.11 (H) 10/25/2024    CREATININE 4.51 (H) 10/19/2024                History of Present Illness     Patient is here today for a follow up exam to recheck surgical wound. He is s/p creation of a AVF done 10/23/2024 and s/p Ligation of RUE AVF by Dr. Lamar done 10/25/2024. He is getting dialysis  at [facility] in [place]. Patient is taking Eliquis. He is a former smoker.         Wound Check    Right upper extremity luis-incisional erythema persist however dramatically improved.  No further drainage able to be expressed.  Minimal skin edge separation in the upper half of the incision.  Alexandro Jefferson is a 74 y.o. male who presents to the office for a right upper extremity wound/luis-incisional erythema recheck  History obtained from: patient and patient's Significant Other    Review of Systems   Constitutional: Negative.    HENT: Negative.     Eyes: Negative.    Respiratory: Negative.     Cardiovascular: Negative.    Gastrointestinal: Negative.    Endocrine: Negative.    Genitourinary: Negative.    Musculoskeletal: Negative.    Skin:  Positive for wound.   Allergic/Immunologic: Negative.    Neurological: Negative.    Hematological: Negative.    Psychiatric/Behavioral: Negative.       Past Medical History   Past Medical History:   Diagnosis Date    A-fib (HCC)     Chronic kidney disease     Pulmonary embolism (HCC)      Past Surgical History:   Procedure Laterality Date    CHOLECYSTECTOMY          COLONOSCOPY      FL LUMBAR PUNCTURE DIAGNOSTIC  2021    VA ARTERIOVENOUS ANASTOMOSIS OPEN DIRECT Right  10/23/2024    Procedure: CREATION FISTULA ARTERIOVENOUS (AV);  Surgeon: Olivia Ball DO;  [facility];  Service: Vascular    NE LIG/BANDING ANGIOACCESS ARTERIOVENOUS FISTULA Right 10/25/2024    Procedure: LIGATION OF RIGHT UPPER EXTREMITY AV FISTULA;  Surgeon: Michael Lamar MD;  Location: [facility];  Service: Vascular     No family history on file.   reports that he has never smoked. He has never used smokeless tobacco. He reports that he does not currently use alcohol. He reports that he does not use drugs.  Current Outpatient Medications on File Prior to Visit   Medication Sig Dispense Refill    acetaminophen (TYLENOL) 650 mg CR tablet Take 1 tablet (650 mg total) by mouth every 8 (eight) hours as needed for mild pain 30 tablet 0    Acetaminophen 500 MG Take 500 mg by mouth      amiodarone 200 mg tablet Take 1 tablet (200 mg total) by mouth daily 90 tablet 3    B Complex-C-Folic Acid (Lissa-Geno) TABS       cephalexin (KEFLEX) 500 mg capsule Take 1 capsule (500 mg total) by mouth every 12 (twelve) hours for 7 days 14 capsule 0    Cholecalciferol (Vitamin D3) 50 MCG (2000 UT) capsule Take 2,000 Units by mouth      cholestyramine light 4 g POWD Take 4 g by mouth 2 (two) times a day      Eliquis 5 MG       famotidine (PEPCID) 20 mg tablet Take 20 mg by mouth 2 (two) times a day      folic acid (FOLVITE) 1 mg tablet       sevelamer carbonate (RENVELA) 800 mg tablet Take 3 tablets (2,400 mg total) by mouth 3 (three) times a day with meals 810 tablet 3    Sucroferric Oxyhydroxide (Velphoro) 500 MG CHEW Chew 1 tablet (500 mg total) 3 (three) times a day with meals 270 tablet 3    ondansetron (ZOFRAN-ODT) 8 mg disintegrating tablet Apply 8 mg to cheek every 8 (eight) hours as needed       No current facility-administered medications on file prior to visit.     Allergies   Allergen Reactions    Metformin GI Intolerance and Nausea Only    Cinacalcet Diarrhea and GI Intolerance    Gabapentin GI  "Intolerance    Oxycodone Confusion     \"Feel spacey\"    Scopolamine Confusion, Dizziness and Hallucinations     Transdermal patch   Incoherent speech    Shellfish Allergy - Food Allergy Other (See Comments)     gout      Current Outpatient Medications on File Prior to Visit   Medication Sig Dispense Refill    acetaminophen (TYLENOL) 650 mg CR tablet Take 1 tablet (650 mg total) by mouth every 8 (eight) hours as needed for mild pain 30 tablet 0    Acetaminophen 500 MG Take 500 mg by mouth      amiodarone 200 mg tablet Take 1 tablet (200 mg total) by mouth daily 90 tablet 3    B Complex-C-Folic Acid (Lissa-Geno) TABS       cephalexin (KEFLEX) 500 mg capsule Take 1 capsule (500 mg total) by mouth every 12 (twelve) hours for 7 days 14 capsule 0    Cholecalciferol (Vitamin D3) 50 MCG (2000 UT) capsule Take 2,000 Units by mouth      cholestyramine light 4 g POWD Take 4 g by mouth 2 (two) times a day      Eliquis 5 MG       famotidine (PEPCID) 20 mg tablet Take 20 mg by mouth 2 (two) times a day      folic acid (FOLVITE) 1 mg tablet       sevelamer carbonate (RENVELA) 800 mg tablet Take 3 tablets (2,400 mg total) by mouth 3 (three) times a day with meals 810 tablet 3    Sucroferric Oxyhydroxide (Velphoro) 500 MG CHEW Chew 1 tablet (500 mg total) 3 (three) times a day with meals 270 tablet 3    ondansetron (ZOFRAN-ODT) 8 mg disintegrating tablet Apply 8 mg to cheek every 8 (eight) hours as needed       No current facility-administered medications on file prior to visit.      Social History     Tobacco Use    Smoking status: Never    Smokeless tobacco: Never   Vaping Use    Vaping status: Never Used   Substance and Sexual Activity    Alcohol use: Not Currently    Drug use: Never    Sexual activity: Not on file        Objective   /80 (BP Location: Left arm, Patient Position: Sitting, Cuff Size: Standard)   Pulse 87   Temp 98 °F (36.7 °C) (Tympanic)   Ht 5' 10\" (1.778 m)   Wt 96.6 kg (213 lb)   SpO2 99%   BMI 30.56 " kg/m²      Physical Exam  Skin:     Findings: Erythema present.      Comments: Right upper extremity luis-incisional erythema persist however improved.  There is no drainage.  There is no malodor.         Administrative Statements   I have spent a total time of 20 minutes in caring for this patient on the day of the visit/encounter including Instructions for management, Patient and family education, Impressions, Documenting in the medical record, and Obtaining or reviewing history  .

## 2024-12-17 ENCOUNTER — OFFICE VISIT (OUTPATIENT)
Dept: VASCULAR SURGERY | Facility: CLINIC | Age: 74
End: 2024-12-17

## 2024-12-17 VITALS
OXYGEN SATURATION: 99 % | HEIGHT: 70 IN | WEIGHT: 209 LBS | RESPIRATION RATE: 16 BRPM | SYSTOLIC BLOOD PRESSURE: 118 MMHG | DIASTOLIC BLOOD PRESSURE: 74 MMHG | BODY MASS INDEX: 29.92 KG/M2 | HEART RATE: 71 BPM

## 2024-12-17 DIAGNOSIS — Z99.2 ESRD (END STAGE RENAL DISEASE) ON DIALYSIS (HCC): Primary | ICD-10-CM

## 2024-12-17 DIAGNOSIS — N18.6 ESRD (END STAGE RENAL DISEASE) ON DIALYSIS (HCC): Primary | ICD-10-CM

## 2024-12-17 PROCEDURE — 99024 POSTOP FOLLOW-UP VISIT: CPT | Performed by: SURGERY

## 2024-12-17 NOTE — PROGRESS NOTES
Name: Alexandro Jefferson      : 1950      MRN: [id number]  Encounter Provider: Olivia Ball DO  Encounter Date: 2024   Encounter department: [facility] [place]  :  Assessment & Plan  ESRD (end stage renal disease) on dialysis (HCC)  Lab Results   Component Value Date    EGFR 5 10/26/2024    EGFR 6 10/25/2024    EGFR 11 10/19/2024    CREATININE 8.79 (H) 10/26/2024    CREATININE 7.11 (H) 10/25/2024    CREATININE 4.51 (H) 10/19/2024   Juan returns to the office for scheduled visit accompanied by his wife to recheck his right upper extremity incision site.  He previously underwent a single-stage brachiobasilic AV fistula complicated by profound steal requiring ligation.  His incision appears to have healed at this point with no further signs of infection.  His right hand is warm and well-perfused and back to baseline.  We did have a discussion regarding alternate AV access sites.  While technically a left upper extremity AV graft can be performed he would be at similar if not higher risk of developing significant steal here as well.  At this time patient and wife expressed that they wish to remain with catheter.  They do appreciate and understand the potential for life-threatening catheter related infections.  I did express that should they change their mind or if something changes to please call the office. I would be happy to see him back on an as-needed basis.             History of Present Illness   HPI  Alexandro Jefferson is a 74 y.o. male who presents to the office accompanied by his wife for a right upper extremity incision recheck.  Patient denies any fevers or chills.  The incision has healed adequately at this point with no further signs of infection.    Patient is s/p RUE AVF creation on 10/23/24 and RUE AVF ligation on 10/25/24. Pt denies RUE pain, abnormal numbness, or weakness. Pt goes HD on MWF.       History obtained from: patient and patient's Significant Other    Review  of Systems   Constitutional: Negative.    HENT: Negative.     Eyes: Negative.    Respiratory:  Positive for shortness of breath (chronic).    Cardiovascular: Negative.    Gastrointestinal:  Positive for diarrhea.   Endocrine: Negative.    Genitourinary: Negative.    Musculoskeletal:  Positive for arthralgias, back pain and gait problem.   Skin: Negative.    Allergic/Immunologic: Negative.    Neurological:  Positive for dizziness and numbness (hands).   Hematological: Negative.    Psychiatric/Behavioral: Negative.       Past Medical History   Past Medical History:   Diagnosis Date    A-fib (HCC)     Chronic kidney disease     Pulmonary embolism (HCC)      Past Surgical History:   Procedure Laterality Date    CHOLECYSTECTOMY      2022    COLONOSCOPY      FL LUMBAR PUNCTURE DIAGNOSTIC  03/19/2021    MS ARTERIOVENOUS ANASTOMOSIS OPEN DIRECT Right 10/23/2024    Procedure: CREATION FISTULA ARTERIOVENOUS (AV);  Surgeon: Olivia Ball DO;  [facility];  Service: Vascular    MS LIG/BANDING ANGIOACCESS ARTERIOVENOUS FISTULA Right 10/25/2024    Procedure: LIGATION OF RIGHT UPPER EXTREMITY AV FISTULA;  Surgeon: Michael Lamar MD;  Location: [facility];  Service: Vascular     No family history on file.   reports that he has never smoked. He has never used smokeless tobacco. He reports that he does not currently use alcohol. He reports that he does not use drugs.  Current Outpatient Medications on File Prior to Visit   Medication Sig Dispense Refill    acetaminophen (TYLENOL) 650 mg CR tablet Take 1 tablet (650 mg total) by mouth every 8 (eight) hours as needed for mild pain 30 tablet 0    Acetaminophen 500 MG Take 500 mg by mouth      amiodarone 200 mg tablet Take 1 tablet (200 mg total) by mouth daily 90 tablet 3    B Complex-C-Folic Acid (Lissa-Geno) TABS       Cholecalciferol (Vitamin D3) 50 MCG (2000 UT) capsule Take 2,000 Units by mouth      cholestyramine light 4 g POWD Take 4 g by mouth 2 (two) times a  "day      Eliquis 5 MG       famotidine (PEPCID) 20 mg tablet Take 20 mg by mouth 2 (two) times a day      folic acid (FOLVITE) 1 mg tablet       ondansetron (ZOFRAN-ODT) 8 mg disintegrating tablet Apply 8 mg to cheek every 8 (eight) hours as needed      sevelamer carbonate (RENVELA) 800 mg tablet Take 3 tablets (2,400 mg total) by mouth 3 (three) times a day with meals 810 tablet 3    Sucroferric Oxyhydroxide (Velphoro) 500 MG CHEW Chew 1 tablet (500 mg total) 3 (three) times a day with meals 270 tablet 3     No current facility-administered medications on file prior to visit.     Allergies   Allergen Reactions    Metformin GI Intolerance and Nausea Only    Cinacalcet Diarrhea and GI Intolerance    Gabapentin GI Intolerance    Oxycodone Confusion     \"Feel spacey\"    Scopolamine Confusion, Dizziness and Hallucinations     Transdermal patch   Incoherent speech    Shellfish Allergy - Food Allergy Other (See Comments)     gout      Current Outpatient Medications on File Prior to Visit   Medication Sig Dispense Refill    acetaminophen (TYLENOL) 650 mg CR tablet Take 1 tablet (650 mg total) by mouth every 8 (eight) hours as needed for mild pain 30 tablet 0    Acetaminophen 500 MG Take 500 mg by mouth      amiodarone 200 mg tablet Take 1 tablet (200 mg total) by mouth daily 90 tablet 3    B Complex-C-Folic Acid (Lissa-Geno) TABS       Cholecalciferol (Vitamin D3) 50 MCG (2000 UT) capsule Take 2,000 Units by mouth      cholestyramine light 4 g POWD Take 4 g by mouth 2 (two) times a day      Eliquis 5 MG       famotidine (PEPCID) 20 mg tablet Take 20 mg by mouth 2 (two) times a day      folic acid (FOLVITE) 1 mg tablet       ondansetron (ZOFRAN-ODT) 8 mg disintegrating tablet Apply 8 mg to cheek every 8 (eight) hours as needed      sevelamer carbonate (RENVELA) 800 mg tablet Take 3 tablets (2,400 mg total) by mouth 3 (three) times a day with meals 810 tablet 3    Sucroferric Oxyhydroxide (Velphoro) 500 MG CHEW Chew 1 tablet " "(500 mg total) 3 (three) times a day with meals 270 tablet 3     No current facility-administered medications on file prior to visit.      Social History     Tobacco Use    Smoking status: Never    Smokeless tobacco: Never   Vaping Use    Vaping status: Never Used   Substance and Sexual Activity    Alcohol use: Not Currently    Drug use: Never    Sexual activity: Not on file        Objective   /74 (BP Location: Left arm, Patient Position: Sitting)   Pulse 71   Resp 16   Ht 5' 10\" (1.778 m)   Wt 94.8 kg (209 lb)   SpO2 99%   BMI 29.99 kg/m²      Physical Exam  Constitutional:       General: He is not in acute distress.     Appearance: He is not ill-appearing, toxic-appearing or diaphoretic.   Cardiovascular:      Rate and Rhythm: Normal rate.   Pulmonary:      Effort: Pulmonary effort is normal.   Neurological:      Mental Status: He is alert and oriented to person, place, and time. Mental status is at baseline.   Psychiatric:         Mood and Affect: Mood normal.         Behavior: Behavior normal.         Thought Content: Thought content normal.         Judgment: Judgment normal.       Right upper extremity incision has healed adequately.  There is no further drainage.  There is no evidence of clinical infection at this time.  Administrative Statements   I have spent a total time of 20 minutes in caring for this patient on the day of the visit/encounter including Prognosis, Instructions for management, Patient and family education, Impressions, Counseling / Coordination of care, Documenting in the medical record, Reviewing / ordering tests, medicine, procedures  , and Obtaining or reviewing history  .   "

## 2024-12-17 NOTE — ASSESSMENT & PLAN NOTE
Lab Results   Component Value Date    EGFR 5 10/26/2024    EGFR 6 10/25/2024    EGFR 11 10/19/2024    CREATININE 8.79 (H) 10/26/2024    CREATININE 7.11 (H) 10/25/2024    CREATININE 4.51 (H) 10/19/2024   Don returns to the office for scheduled visit accompanied by his wife to recheck his right upper extremity incision site.  He previously underwent a single-stage brachiobasilic AV fistula complicated by profound steal requiring ligation.  His incision appears to have healed at this point with no further signs of infection.  His right hand is warm and well-perfused and back to baseline.  We did have a discussion regarding alternate AV access sites.  While technically a left upper extremity AV graft can be performed he would be at similar if not higher risk of developing significant steal here as well.  At this time patient and wife expressed that they wish to remain with catheter.  They do appreciate and understand the potential for life-threatening catheter related infections.  I did express that should they change their mind or if something changes to please call the office. I would be happy to see him back on an as-needed basis.

## 2025-01-09 ENCOUNTER — OFFICE VISIT (OUTPATIENT)
Dept: CARDIOLOGY CLINIC | Facility: CLINIC | Age: 75
End: 2025-01-09
Payer: MEDICARE

## 2025-01-09 VITALS
HEIGHT: 70 IN | SYSTOLIC BLOOD PRESSURE: 144 MMHG | WEIGHT: 196 LBS | DIASTOLIC BLOOD PRESSURE: 86 MMHG | BODY MASS INDEX: 28.06 KG/M2 | HEART RATE: 86 BPM

## 2025-01-09 DIAGNOSIS — Z99.2 ESRD (END STAGE RENAL DISEASE) ON DIALYSIS (HCC): ICD-10-CM

## 2025-01-09 DIAGNOSIS — N18.6 ESRD (END STAGE RENAL DISEASE) ON DIALYSIS (HCC): ICD-10-CM

## 2025-01-09 DIAGNOSIS — I48.0 PAROXYSMAL ATRIAL FIBRILLATION (HCC): Primary | ICD-10-CM

## 2025-01-09 DIAGNOSIS — Z86.711 HISTORY OF PULMONARY EMBOLISM: ICD-10-CM

## 2025-01-09 DIAGNOSIS — I27.9 CHRONIC PULMONARY HEART DISEASE (HCC): ICD-10-CM

## 2025-01-09 DIAGNOSIS — I10 PRIMARY HYPERTENSION: ICD-10-CM

## 2025-01-09 DIAGNOSIS — Z79.899 ON AMIODARONE THERAPY: ICD-10-CM

## 2025-01-09 DIAGNOSIS — I35.0 MODERATE AORTIC VALVE STENOSIS: ICD-10-CM

## 2025-01-09 PROCEDURE — 99214 OFFICE O/P EST MOD 30 MIN: CPT | Performed by: INTERNAL MEDICINE

## 2025-01-09 PROCEDURE — 93000 ELECTROCARDIOGRAM COMPLETE: CPT | Performed by: INTERNAL MEDICINE

## 2025-01-09 NOTE — ASSESSMENT & PLAN NOTE
History of pulm embolism in the remote past.  Is on chronic anticoagulation.  Has COPD.  NIGEL in October identified dilated RV with normal RV function.  Will continue current medications and consider repeat echocardiogram in a few months.

## 2025-01-09 NOTE — ASSESSMENT & PLAN NOTE
Has moderate aortic valve stenosis.  On examination I could not appreciate murmur due to distant heart sounds.  Does not appear to have signs of pulmonary or peripheral vascular congestion.  He is not a candidate for intervention due to comorbidities.  Will consider echocardiogram prior to next visit in 6 months.

## 2025-01-09 NOTE — ASSESSMENT & PLAN NOTE
Blood pressure reports that blood pressure at home and during dialysis has been normal.  Has history of hypotension in the past.  Goal blood pressure is systolic less than 130 mmHg and diastolic less than 90 mmHg.  Advise close monitoring of blood pressures.

## 2025-01-09 NOTE — ASSESSMENT & PLAN NOTE
Is currently in sinus rhythm.  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  Is on amiodarone therapy.  Is on anticoagulation with Eliquis.  Zio XT Holter monitor had demonstrated predominant atrial fibrillation rhythm in October 2024.  Heart rate in atrial fibrillation was controlled with average heart rate 90 bpm.  Advising to continue current medications.  Will need close monitoring for amiodarone related side effects and toxicities.  Schedule of monitoring as outlined below.  As I did not see any recent thyroid function test will request 1 with next routine blood work.  Should also have liver function test every 6 months.  She will keep close eye on symptoms of lung disease.    RECOMMENDED ROUTINE MONITORING FOR PATIENTS RECEIVING AMIODARONE  Thyroid function tests--- Baseline and every 6 mo   Electrolytes, serum creatinine --- Baseline and as indicated   Chest radiograph--- Baseline and annually   Pulmonary function tests, with Dlco -- Baseline and for unexplained dyspnea or new chest radiographic findings.  Ophthalmologic evaluation--- If visual impairment or for symptoms   ECG--- Baseline and annually    Orders:    POCT ECG    TSH+Free T4; Future

## 2025-01-09 NOTE — ASSESSMENT & PLAN NOTE
Lab Results   Component Value Date    EGFR 5 10/26/2024    EGFR 6 10/25/2024    EGFR 11 10/19/2024    CREATININE 8.79 (H) 10/26/2024    CREATININE 7.11 (H) 10/25/2024    CREATININE 4.51 (H) 10/19/2024   Continued hemodialysis on a regular basis.  Will need close monitoring for CKD related electrolyte abnormalities and anemia.

## 2025-01-09 NOTE — PATIENT INSTRUCTIONS
Assessment & Plan  Paroxysmal atrial fibrillation (HCC)  Is currently in sinus rhythm.  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  Is on amiodarone therapy.  Is on anticoagulation with Eliquis.  Zio XT Holter monitor had demonstrated predominant atrial fibrillation rhythm in October 2024.  Heart rate in atrial fibrillation was controlled with average heart rate 90 bpm.  Advising to continue current medications.  Will need close monitoring for amiodarone related side effects and toxicities.  Schedule of monitoring as outlined below.  As I did not see any recent thyroid function test will request 1 with next routine blood work.  Should also have liver function test every 6 months.  She will keep close eye on symptoms of lung disease.    RECOMMENDED ROUTINE MONITORING FOR PATIENTS RECEIVING AMIODARONE  Thyroid function tests--- Baseline and every 6 mo   Electrolytes, serum creatinine --- Baseline and as indicated   Chest radiograph--- Baseline and annually   Pulmonary function tests, with Dlco -- Baseline and for unexplained dyspnea or new chest radiographic findings.  Ophthalmologic evaluation--- If visual impairment or for symptoms   ECG--- Baseline and annually    Orders:    POCT ECG    TSH+Free T4; Future    Primary hypertension  Blood pressure reports that blood pressure at home and during dialysis has been normal.  Has history of hypotension in the past.  Goal blood pressure is systolic less than 130 mmHg and diastolic less than 90 mmHg.  Advise close monitoring of blood pressures.       ESRD (end stage renal disease) on dialysis ([facility], [facility] within [facility])  Lab Results   Component Value Date    EGFR 5 10/26/2024    EGFR 6 10/25/2024    EGFR 11 10/19/2024    CREATININE 8.79 (H) 10/26/2024    CREATININE 7.11 (H) 10/25/2024    CREATININE 4.51 (H) 10/19/2024   Continued hemodialysis on a regular basis.       On amiodarone therapy  Monitoring for amiodarone related side effects and  [facility]ult[facility] in Cardiology and Electrophysiology daily Progress Note    Assessment & Plan   Mague Nova is a 72 year old year old male who presents from OSH on 2/5/2024 for higher level of care.  He presented to [place], [place] on 1/4/2024 with acute hypoxic respiratory failure and CHF exacerbation.      Follow with Dr.Abdel-Latief Simon     Acute on chronic respiratory failure:  -Pulmonary edema   -Aspiration PNA  -COPD on 2L  -Intubated.      Cardiac arrest:  -Cardiac arrest on 1/6/2024.   -Most likely secondary to hypoxia.      Acute on chronic systolic and diastolic HF:   -Hx of ICM/HFrEF  -TTE 2/5/24: mild LVH, LVIDd 5.2 cm, mild TR LVEF 30-35%, apical akinesis , normal RV size and Fx, mild to mod eccentric MR, IC dilated with blunted resp response    -TTE (3/2023): EF 40-45%.    -TTE (2/5/2024): mild LVH, LVIDd 5.2 cm, mild TR LVEF 30-35%, apical akinesis , normal RV size and Fx, mild to mod eccentric MR, IC dilated with blunted resp response      -NT proBNP 3,515.   -CXR (2/6/2024): Low lung volumes with persistent bilateral interstitial and alveolar airspace opacities. Stable small left pleural effusion.      AAKASH on CKD on ESRD:  -Started on CRRT at OSH.      GI bleed  C. Diff +:  -GIB at OSH, no evidence of GIB at this time.   -GI following.      Coronaries:  -H/O CAD s/p PCI to LAD in 2005.   -HS troponin: 141.  Repeat pending.   -Denies chest pain.      Electrophysiology:  -H/O CHB s/p dual chamber PPM in 2008.   -S/p upgrade to [employer] BiV ICD on 11/23/2015.   -s/p CRT-D , AICD battery was also exchanged1/29/2024 by Dr. Collin White at [facility] in [place], [place].   -EKG (2/6/2024): AV dual-paced rhythm.    -Device interrogation (2/6/2024):  lead thresholds stable      #Acute hypoxic respiratory failure requiring multiple intubation this admission  #Acute on chronic systolic HF  #Cardiac arrest on 1/6/24, presumably a respiratory cause  #Hx of Pulmonary  Sarcoidosis on Plaquenil   #Hx of CAD: s/p EUGENIE in the LAD in 2005  #Hx of ICM/HFrEF  #S/p CRT-D , AICD battery was also exchanged  #Metabolic encephalopathy  #AAKASH on CKD on CRRT  #Pulmonary edema   #Aspiration PNA/C. Diff +   #COPD on 2L  PAYAL  HTN/HLD/DM  PVD  Anemia   BPH       Recommendations:  -Weaned off pressors. Still intubated (Fio2 50%, PEEP 8)   - Tele AV-paced   - MVO2 from internal jugular vein central line 79% no need for inotropic support    - Sepsis management per MICU  - Renal fx improving,  cc, check CVP and IV diuresis for CVP goal ~5-8  - s/p CRT-D , battery exchanged 1/29/2024 with concern for worsening hematoma at the site, will get chest CT w/o contrast. Pt will be evaluated by EP today  - Keep holding AC  - Hx of Pulmonary sarcoidosis , can consider work up for cardiac involvement as outpt    - Plan was discussed with MICU team   - Family was updated     This patient is of high medical complexity.    I have personally provided 35 minutes of critical care time exclusive of time spent on separately billable procedures. Time includes review of laboratory data, radiology results, discussion with consultants, and monitoring for potential decompensation. Interventions were performed as documented above.     Daksha Mack.MD   Advanced Heart Failure, Mechanical Circulatory Support, and Transplant Cardiology  [facility] Cardiology and Electrophysiology  Office/answering service: [phone]        Subjective   Interval History:   Intubated  Weaned off vasopressors       Objective    Vital signs in last 24 hours:  Temp:  [97.5 °F (36.4 °C)-98.6 °F (37 °C)] 97.5 °F (36.4 °C)  Heart Rate:  [66-80] 67  Resp:  [19-30] 28  BP: (134-167)/(69-71) 134/71  Arterial Line BP: (100-178)/(41-76) 142/56  FiO2 (%):  [50 %] 50 %    Intake/Output last 3 shifts:  I/O last 3 completed shifts:  In: 2194 [I.V.:1555.7; Blood:343; IV Piggyback:295.3]  Out: 1275 [Urine:875; Stool:400]  Intake/Output this  toxicities as outlined above.  Orders:    TSH+Free T4; Future    History of pulmonary embolism  History of pulm embolism in the remote past.  Is on chronic anticoagulation.  Has COPD.  NIGEL in October identified dilated RV with normal RV function.  Will continue current medications and consider repeat echocardiogram in a few months.       Moderate aortic valve stenosis  Has moderate aortic valve stenosis.  On examination I could not appreciate murmur due to distant heart sounds.  Does not appear to have signs of pulmonary or peripheral vascular congestion.  He is not a candidate for intervention due to comorbidities.  Will consider echocardiogram prior to next visit in 6 months.        shift:  I/O this shift:  In: 53.1 [I.V.:53.1]  Out: -      Physical Exam:   Intubated  Neck: no carotid bruit, no JVD and supple, symmetrical, trachea midline  Lungs: clear to auscultation bilaterally  Chest wall: ICD   Heart: regular rate and rhythm, S1, S2 normal, no murmur, click, rub or gallop  Abdomen: soft, non-tender; bowel sounds normal; no masses,  no organomegaly  Extremities: extremities normal, no cyanosis or edema  Pulses: 2+ and symmetric       Medications:  Current Facility-Administered Medications   Medication Dose Route Frequency Provider Last Rate Last Admin    potassium CHLORIDE 20 mEq/100mL IVPB premix  20 mEq Intravenous Once Nori Chapa MD 50 mL/hr at 02/09/24 1039 20 mEq at 02/09/24 1039    Followed by    potassium CHLORIDE 20 mEq/100mL IVPB premix  20 mEq Intravenous Once Nori Chapa MD        [Held by provider] fondaparinux (ARIXTRA) injection 2.5 mg  2.5 mg Subcutaneous Every Other Day Nori Chapa MD        nystatin (MYCOSTATIN) 913126 UNIT/ML suspension 500,000 Units  500,000 Units Swish & Swallow 4x Daily Nori Chapa MD        insulin glargine (LANTUS) injection 15 Units  15 Units Subcutaneous 2 times per day Nori Chapa MD        sodium PHOSPHATE 15 mmol in sodium chloride 0.9 % 250 mL IVPB  15 mmol Intravenous Once Jamin Marlow MD        metroNIDAZOLE (FLAGYL) premix IVPB 500 mg  500 mg Intravenous 3 times per day Nori Chapa MD   Completed at 02/09/24 0543    pantoprazole (PROTONIX) 40 MG/20ML (compounded) suspension 40 mg  40 mg Per NG Tube Nightly Nori Chapa MD   40 mg at 02/08/24 2040    vancomycin (FIRVANQ) 250 MG/5ML solution 500 mg  500 mg Per NG Tube 4x Daily Nori Chapa MD   500 mg at 02/09/24 0825    sodium chloride 0.9 % injection 2 mL  2 mL Intracatheter 2 times per day Karri Bartholomew DO   2 mL at 02/09/24 0825    CARBOXYMethylcellulose (REFRESH TEARS) 0.5 % ophthalmic solution 1 drop  1 drop Both Eyes 6 times per day Toni Cardoza DO   1 drop at  02/09/24 0826    chlorhexidine gluconate (PERIDEX) 0.12 % solution 15 mL  15 mL Swish & Spit 2 times per day Toni Cardoza DO   15 mL at 02/09/24 0825    hydroxychloroquine (PLAQUENIL) tablet 200 mg  200 mg Per NG Tube Daily Toni Cardzoa DO   200 mg at 02/09/24 0825     Current Facility-Administered Medications   Medication Dose Route Frequency Provider Last Rate Last Admin    dexMEDEtomidine (PRECEDEX) 400 mcg/100 mL in sodium chloride 0.9 % infusion  0-1 mcg/kg/hr (Dosing Weight) Intravenous Continuous Nori Chapa MD 17.4 mL/hr at 02/09/24 0959 0.7 mcg/kg/hr at 02/09/24 0959    sodium chloride 0.9% infusion   Intravenous Continuous PRN Nori Chapa MD        fentaNYL (SUBLIMAZE) 2,500 mcg/250 mL in sodium chloride 0.9 % infusion  0-150 mcg/hr Intravenous Continuous Edgardo Painter DO   Completed at 02/09/24 0417     Current Facility-Administered Medications   Medication Dose Route Frequency Provider Last Rate Last Admin    dextrose 50 % injection 25 g  25 g Intravenous PRN [place], Samee, DO        dextrose 50 % injection 12.5 g  12.5 g Intravenous PRN Maximus Bartholomewe, DO        glucagon (GLUCAGEN) injection 1 mg  1 mg Intramuscular PRN Maximus Bartholomewe, DO        dextrose (GLUTOSE) 40 % gel 15 g  15 g Oral PRN [place] Samee, DO        dextrose (GLUTOSE) 40 % gel 30 g  30 g Oral PRN [place] Samee, DO        sodium chloride 0.9% infusion   Intravenous Continuous PRN Nori Chapa MD        fentaNYL bolus from bag  mcg   mcg Intravenous Q15 Min PRN Edgardo Painter DO   25 mcg at 02/08/24 0942    sodium chloride 0.9 % injection 20 mL  20 mL Injection PRN [place] MD        sodium chloride 0.9 % flush bag 25 mL  25 mL Intravenous PRN Karri Bartholomew, DO        polyethylene glycol (MIRALAX) packet 17 g  17 g Per NG Tube Daily PRN Toni Cardoza DO        Or    bisacodyl (DULCOLAX) suppository 10 mg  10 mg Rectal PRN Toni Cardoza DO        CARBOXYMethylcellulose (REFRESH TEARS) 0.5  % ophthalmic solution 1 drop  1 drop Both Eyes PRN Toni Cardoza DO        ipratropium-albuterol (DUONEB) 0.5-2.5 (3) MG/3ML nebulizer solution 3 mL  3 mL Nebulization Q6H Resp PRN Amanda Lazaro MD            Results:  Recent Labs   Lab 02/09/24  0417 02/08/24  2043 02/08/24  1312   WBC 7.5 9.3 10.1   HGB 8.0* 8.3* 6.9*   HCT 24.5* 25.0* 21.5*   PLT 90* 103* 109*       Recent Labs   Lab 02/09/24  0417 02/08/24  0335 02/07/24  1600 02/06/24  1555 02/06/24  0405   CO2 29 23 22   < > 32   BUN 37* 33* 23*   < > 79*   CREATININE 1.39* 1.63* 1.17   < > 1.47*   CALCIUM 8.4 7.8* 8.3*   < > 9.0   ALBUMIN 1.7* 1.9*  --   --  2.0*   AST 23 34  --   --  13   GLUCOSE 142* 385* 225*   < > 172*    < > = values in this interval not displayed.         Lab Results   Component Value Date    INR 1.0 02/05/2024       @LABALLVALUEIP(TROPONINI:*)@      No results found    Invalid input(s): \"LDL\"    XR CHEST AP OR PA    Result Date: 2/7/2024  History: Line placement confirmation Exam: XR CHEST AP OR PA. Comparison: Same day. Findings: Endotracheal tube is in the mid trachea. Right IJ central venous catheter tip terminates in the upper SVC. Right PICC line has been removed. NG tube courses into the stomach. Left chest wall cardiac device leads are unchanged. The cardiac silhouette is stable . Unchanged left retrohilar and lower lobe consolidation and patchy parenchymal opacity right lower lobe. No pneumothorax. No acute osseous abnormality.     Impression: 1.   Right PICC line has been removed. Remaining supporting lines and tubes are unchanged. 2.   No significant change in bibasilar consolidations. Electronically Signed by: MELANIE BOOTH MD Signed on: 2/7/2024 2:39 PM Workstation ID: [id number]

## 2025-01-09 NOTE — ASSESSMENT & PLAN NOTE
Monitoring for amiodarone related side effects and toxicities as outlined above.  Orders:    TSH+Free T4; Future

## 2025-01-09 NOTE — PROGRESS NOTES
Name: Alexandro Jefferson      : 1950      MRN: [id number]  Encounter Provider: Ana Lilia Barkley MD  Encounter Date: 2025   Encounter department: [facility] CARDIOLOGY [place]    DIAGNOSES:  1.  Remote history of pulmonary embolism  2.  History of end-stage renal  disease, on hemodialysis since .  3.  History of pancreatitis leading up to prolonged hospitalization and dependence on mechanical ventilation, history of tracheostomy and PEG tube placement  4.  Paroxysmal atrial fibrillation  5.  Secondary hyperparathyroidism  6.  History of dialysis fistula stenosis  7.  Status post recent MSSA bacteremia with suspected endocarditis treated with prolonged antibiotic therapy  9.  Diabetes mellitus  10.  Obesity  11.  Ambulatory dysfunction  12.  Anemia due to chronic kidney disease  13.  Pulmonary hypertension  14.  Aortic valve stenosis    TRANSESOPHAGEAL ECHOCARDIOGRAM 2024: Normal left ventricular size and function.  EF 55 to 60%.  Dilated right ventricle with normal right ventricular function.  Normal left atrial size.  Normal flow velocities in left atrial appendage with no evidence of thrombus.  No PFO.  Normal right atrial size.  Mild mitral valve regurgitation.  Small, mobile, vegetation versus fibrinous strand on the posterior mitral leaflet measuring 0.49 cm.  Freely mobile.  Mild tricuspid valve regurgitation.  Moderate aortic valve stenosis with aortic valve area 1.3 cm², peak gradient 3 m/s and mean gradient 34, DVI 0.4  No pulmonic valve stenosis or regurgitation.  Small amount of atherosclerotic plaque noted in aorta.  No pericardial effusion    ZIO EXTENDED HOLTER MONITORING REVIEW REPORT   -- Patient was monitored for 13 days 20 hours between 10/8/2024 and 10/22/2024.  Indication for monitoring was for detection of atrial fibrillation.  -- Atrial Fibrillation/Flutter occurred continuously (100% burden), ranging from  bpm (avg of 90 bpm).  Bundle branch block conduction  abnormality was noted.  -- There was no occurrence of other supraventricular tachycardia or ventricular tachycardia or Mobitz type II or higher degree AV block or clinically significant pause events.  -- Isolated VEs were rare (<1.0%), VE Couplets were rare (<1.0%), and no VE Triplets were present.  -- There were 2 triggered events and 1 diary entry.  Triggered events correlated with artifact.  Diary entry included feeling of sharp jab while watching television.  Rhythm at the time was atrial fibrillation with heart rate ranging  bpm.  Conclusion: Abnormal extended Holter monitor findings with predominant atrial fibrillation rhythm.  Heart rate control in atrial fibrillation was normal.  Patient is already on anticoagulation and rate control medications.  He is on reduced dose of amiodarone.  Amiodarone dose was recently increased to 200 mg once daily.  Cardioversion to restore sinus rhythm can be considered in the future following discussion of risks, benefits and alternatives.    TTE [facility] 4/2/2024: EF 50 to 55%, grade 2 diastolic dysfunction, mild mitral valve trace tricuspid valve regurgitation moderate aortic valve stenosis.  Assessment & Plan  Paroxysmal atrial fibrillation (HCC)  Is currently in sinus rhythm.  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  Is on amiodarone therapy.  Is on anticoagulation with Eliquis.  Zio XT Holter monitor had demonstrated predominant atrial fibrillation rhythm in October 2024.  Heart rate in atrial fibrillation was controlled with average heart rate 90 bpm.  Advising to continue current medications.  Will need close monitoring for amiodarone related side effects and toxicities.  Schedule of monitoring as outlined below.  As I did not see any recent thyroid function test will request 1 with next routine blood work.  Should also have liver function test every 6 months.  She will keep close eye on symptoms of lung  disease.    RECOMMENDED ROUTINE MONITORING FOR PATIENTS RECEIVING AMIODARONE  Thyroid function tests--- Baseline and every 6 mo   Electrolytes, serum creatinine --- Baseline and as indicated   Chest radiograph--- Baseline and annually   Pulmonary function tests, with Dlco -- Baseline and for unexplained dyspnea or new chest radiographic findings.  Ophthalmologic evaluation--- If visual impairment or for symptoms   ECG--- Baseline and annually    Orders:    POCT ECG    TSH+Free T4; Future    Primary hypertension  Blood pressure reports that blood pressure at home and during dialysis has been normal.  Has history of hypotension in the past.  Goal blood pressure is systolic less than 130 mmHg and diastolic less than 90 mmHg.  Advise close monitoring of blood pressures.       ESRD (end stage renal disease) on dialysis ([facility])  Lab Results   Component Value Date    EGFR 5 10/26/2024    EGFR 6 10/25/2024    EGFR 11 10/19/2024    CREATININE 8.79 (H) 10/26/2024    CREATININE 7.11 (H) 10/25/2024    CREATININE 4.51 (H) 10/19/2024   Continued hemodialysis on a regular basis.  Will need close monitoring for CKD related electrolyte abnormalities and anemia.       On amiodarone therapy  Monitoring for amiodarone related side effects and toxicities as outlined above.  Orders:    TSH+Free T4; Future    History of pulmonary embolism  History of pulm embolism in the remote past.  Is on chronic anticoagulation.  Has COPD.  NIGEL in October identified dilated RV with normal RV function.  Will continue current medications and consider repeat echocardiogram in a few months.       Moderate aortic valve stenosis  Has moderate aortic valve stenosis.  On examination I could not appreciate murmur due to distant heart sounds.  Does not appear to have signs of pulmonary or peripheral vascular congestion.  He is not a candidate for intervention due to comorbidities.  Will consider echocardiogram prior to next visit in 6 months.       Chronic  pulmonary heart disease (HCC)         Body mass index (BMI) 40.0-44.9, adult ([facility])             History of Present Illness   HPI  Alexandro Jefferson is a 74 y.o. male who presents regarding follow-up of his cardiac comorbidities including end-stage renal disease, pulm paroxysmal atrial fibrillation, pulmonary embolism, pulmonary hypertension, aortic valve stenosis and other comorbidities.  He was last seen by me in October 2024 before undergoing dialysis fistula revision surgery.    History obtained from: patient    He is here for visit accompanied with his wife.  Last visit he underwent right brachiobasilic AV fistula creation on 10/23/2024.  This was complicated with development of critical limb ischemia.  He was admitted to the hospital between 10/25/2024 and 10/26/2024.  Underwent fistula ligation.  He is now using the dialysis catheter in the chest for hemodialysis.  He has residual numbness and feeling of cold in the right hand arm and he is not able to use his right hand.  He already has a failed fistula in the left arm and he is very disappointed about his condition.  He is concerned what would happen if his catheter fails.  He continues to be compliant with hemodialysis.  He has fatigue and shortness of breath which have been chronic.  Denies any passing out or near passing out episodes.  Has occasional mild swelling in the feet.  Reports that he monitors his blood pressures at home and they have been predominantly in normal range.  Previously reports that he used to have low blood pressures and was on midodrine a few years back.    Tobacco or alcohol dependence: He has never been a smoker.  He denies any alcohol use or any history of illicit drug use.     He retired from various jobs including previously working at [employer] for 24 years and then other jobs and subsequently retired from selling cars.     Family history: His father had aortic valve replacement at 80 years of age.     Current cardiac meds:  Eliquis 5 mg twice daily amiodarone 200 mg daily.  He is not on any beta-blocker or other medications.     Last recent comprehensive blood work available:   Blood work 10/26/2024 sodium 138 potassium 3.7 chloride 101 bicarb 24 BUN 56 creatinine 8.79 GFR 5  LFTs significant for alk phos 111 total protein 6.0 albumin 3.1  Last hemoglobin 10.7 hematocrit 34.7 platelet count 168 on 10/26/2024  INR 1.54 on 10/25/2024  TSH 1.32 on 7/16/2021    ECG:   Results for orders placed or performed in visit on 01/09/25   POCT ECG    Narrative    Sinus rhythm with first-degree AV block with incomplete right bundle branch block and left posterior hemiblock.  Significant artifact.  P waves are clear in lead V3.  Compared to previous ECG sinus rhythm has replaced atrial fibrillation.  HR 86 bpm.  No left or right atrial enlargement noted on limited assessment.       REVIEW OF SYSTEMS   Positive for: As noted above in HPI  Negative for: All remaining as reviewed below and in HPI.    SYSTEM SYMPTOMS REVIEWED:  General--weight change, fever, night sweats  Respiratory--cough, wheezing, shortness of breath, sputum production  Cardiovascular--chest pain, syncope, dyspnea on exertion, edema, decline in exercise tolerance, claudication   Gastrointestinal--persistent vomiting, diarrhea, abdominal distention, blood in stool   Muscular or skeletal--joint pain or swelling   Neurologic--headaches, syncope, abnormal movement  Hematologic--history of easy bruising and bleeding   Endocrine--thyroid enlargement, heat or cold intolerance, polyuria   Psychiatric--anxiety, depression     CURRENT MEDICATIONS LIST     Current Outpatient Medications:     acetaminophen (TYLENOL) 650 mg CR tablet, Take 1 tablet (650 mg total) by mouth every 8 (eight) hours as needed for mild pain, Disp: 30 tablet, Rfl: 0    Acetaminophen 500 MG, Take 500 mg by mouth, Disp: , Rfl:     amiodarone 200 mg tablet, Take 1 tablet (200 mg total) by mouth daily, Disp: 90 tablet, Rfl:  "3    B Complex-C-Folic Acid (Lissa-Geno) TABS, , Disp: , Rfl:     Cholecalciferol (Vitamin D3) 50 MCG (2000 UT) capsule, Take 2,000 Units by mouth, Disp: , Rfl:     cholestyramine light 4 g POWD, Take 4 g by mouth 2 (two) times a day, Disp: , Rfl:     Eliquis 5 MG, Take 5 mg by mouth 2 (two) times a day, Disp: , Rfl:     famotidine (PEPCID) 20 mg tablet, Take 20 mg by mouth 2 (two) times a day, Disp: , Rfl:     folic acid (FOLVITE) 1 mg tablet, Take 1 mg by mouth daily, Disp: , Rfl:     ondansetron (ZOFRAN-ODT) 8 mg disintegrating tablet, Apply 8 mg to cheek every 8 (eight) hours as needed, Disp: , Rfl:     sevelamer carbonate (RENVELA) 800 mg tablet, Take 3 tablets (2,400 mg total) by mouth 3 (three) times a day with meals, Disp: 810 tablet, Rfl: 3    Sucroferric Oxyhydroxide (Velphoro) 500 MG CHEW, Chew 1 tablet (500 mg total) 3 (three) times a day with meals (Patient not taking: Reported on 1/9/2025), Disp: 270 tablet, Rfl: 3       ALLERGIES     Allergies   Allergen Reactions    Metformin GI Intolerance and Nausea Only    Cinacalcet Diarrhea and GI Intolerance    Gabapentin GI Intolerance    Oxycodone Confusion     \"Feel spacey\"    Scopolamine Confusion, Dizziness and Hallucinations     Transdermal patch   Incoherent speech    Shellfish Allergy - Food Allergy Other (See Comments)     gout       *-*-*-*-*-*-*-*-*-*-*-*-*-*-*-*-*-*-*-*-*-*-*-*-*-*-*-*-*-*-*-*-*-*-*-*-*-*-*-*-*-*-*-*-*-*-*-*-*-*-*-*-*-*-    Medical History Reviewed by provider this encounter:     .     Objective   /86   Pulse 86   Ht 5' 10\" (1.778 m)   Wt 88.9 kg (196 lb) Comment: holding on to bar  BMI 28.12 kg/m²      Physical Exam  Constitutional:       Appearance: He is obese.   HENT:      Head: Normocephalic.      Mouth/Throat:      Mouth: Mucous membranes are dry.   Neck:      Vascular: No carotid bruit.   Cardiovascular:      Rate and Rhythm: Normal rate and regular rhythm.      Comments:  distant heart sounds.  Has systolic murmur " along sternal border.  Pulmonary:      Comments: Decreased breath sounds at bases bilaterally without wheezing or crackles.  Abdominal:      Palpations: Abdomen is soft.   Musculoskeletal:         General: Deformity present.      Right lower leg: Edema present.      Left lower leg: Edema present.      Comments: Severe kyphosis noted.  Has dialysis catheter in right prepectoral region.   Skin:     General: Skin is warm and dry.      Comments: Dryness of skin and ecchymosis noted in various areas.   Neurological:      Mental Status: He is alert and oriented to person, place, and time. Mental status is at baseline.   Psychiatric:         Behavior: Behavior normal.

## 2025-03-28 ENCOUNTER — TELEPHONE (OUTPATIENT)
Dept: CARDIOLOGY CLINIC | Facility: CLINIC | Age: 75
End: 2025-03-28

## 2025-03-28 NOTE — TELEPHONE ENCOUNTER
CALLED PT MOVED APPT TO 1020 7/17 AS DR Thomas IS WITH A FELLOW AT THE 1040 SLOT LEFT DETAILED VM

## (undated) DEVICE — TIBURON SPLIT SHEET: Brand: CONVERTORS

## (undated) DEVICE — SUT SILK 2-0 30 IN A305H

## (undated) DEVICE — KERLIX BANDAGE ROLL: Brand: KERLIX

## (undated) DEVICE — DRAPE SHEET X-LG

## (undated) DEVICE — VESSEL CANNULA

## (undated) DEVICE — STRL BETHLEHEM A V FISTULA PK: Brand: CARDINAL HEALTH

## (undated) DEVICE — 3M™ IOBAN™ 2 ANTIMICROBIAL INCISE DRAPE 6640EZ: Brand: IOBAN™ 2

## (undated) DEVICE — LIGACLIP MCA MULTIPLE CLIP APPLIERS, 20 SMALL CLIPS: Brand: LIGACLIP

## (undated) DEVICE — SUT PROLENE 6-0 BV-1/BV-1 24 IN 8805H

## (undated) DEVICE — DRAPE INTESTINAL ISOLATION BAG

## (undated) DEVICE — SURGICEL FIBRILLAR 1 X 2

## (undated) DEVICE — DRAPE SHEET THREE QUARTER

## (undated) DEVICE — PROXIMATE SKIN STAPLERS (35 WIDE) CONTAINS 35 STAINLESS STEEL STAPLES (FIXED HEAD): Brand: PROXIMATE

## (undated) DEVICE — EXOFIN PRECISION PEN HIGH VISCOSITY TOPICAL SKIN ADHESIVE: Brand: EXOFIN PRECISION PEN, 1G

## (undated) DEVICE — DRESSING MEPILEX AG BORDER POST-OP 4 X 6 IN

## (undated) DEVICE — SUT MONOCRYL 4-0 PS-2 27 IN Y426H

## (undated) DEVICE — SUT SILK 2-0 SH 30 IN K833H

## (undated) DEVICE — SPONGE STICK WITH PVP-I: Brand: KENDALL

## (undated) DEVICE — DECANTER: Brand: UNBRANDED

## (undated) DEVICE — LIGACLIP MCA MULTIPLE CLIP APPLIERS, 20 MEDIUM CLIPS: Brand: LIGACLIP

## (undated) DEVICE — SUT MONOCRYL 3-0 SH 27 IN Y416H

## (undated) DEVICE — SUT PROLENE 6-0 BV130 30 IN 8709H

## (undated) DEVICE — PETRI DISH STERILE

## (undated) DEVICE — EMERALD TOP SHEET DRAPE: Brand: CONVERTORS

## (undated) DEVICE — SUT SILK 4-0 30 IN A303H

## (undated) DEVICE — GLOVE SRG BIOGEL 7.5

## (undated) DEVICE — SCD SEQUENTIAL COMPRESSION COMFORT SLEEVE MEDIUM KNEE LENGTH: Brand: KENDALL SCD

## (undated) DEVICE — ANTIBACTERIAL UNDYED BRAIDED (POLYGLACTIN 910), SYNTHETIC ABSORBABLE SUTURE: Brand: COATED VICRYL

## (undated) DEVICE — 40529 DERMAPROX PAD 11'' X 15'' X 1'': Brand: 40529 DERMAPROX PAD 11'' X 15'' X 1''

## (undated) DEVICE — DRESSING MEPILEX AG BORDER POST-OP 4 X 12 IN

## (undated) DEVICE — CHLORAPREP HI-LITE 26ML ORANGE

## (undated) DEVICE — SUT SILK 3-0 30 IN A304H

## (undated) DEVICE — GLOVE SRG BIOGEL 7

## (undated) DEVICE — ULTRASOUND GEL STERILE FOIL PK

## (undated) DEVICE — GLOVE INDICATOR PI UNDERGLOVE SZ 8 BLUE

## (undated) DEVICE — ACE WRAP 6 IN STERILE

## (undated) DEVICE — IV CATH INTROCAN 18G X 1 1/4 SAFETY